# Patient Record
Sex: FEMALE | Race: WHITE | NOT HISPANIC OR LATINO | Employment: FULL TIME | URBAN - METROPOLITAN AREA
[De-identification: names, ages, dates, MRNs, and addresses within clinical notes are randomized per-mention and may not be internally consistent; named-entity substitution may affect disease eponyms.]

---

## 2019-07-20 ENCOUNTER — HOSPITAL ENCOUNTER (EMERGENCY)
Facility: CLINIC | Age: 29
Discharge: HOME | End: 2019-07-20
Attending: EMERGENCY MEDICINE | Admitting: EMERGENCY MEDICINE
Payer: COMMERCIAL

## 2019-07-20 ENCOUNTER — APPOINTMENT (OUTPATIENT)
Dept: ULTRASOUND IMAGING | Facility: CLINIC | Age: 29
End: 2019-07-20
Attending: EMERGENCY MEDICINE
Payer: COMMERCIAL

## 2019-07-20 VITALS
DIASTOLIC BLOOD PRESSURE: 74 MMHG | OXYGEN SATURATION: 100 % | RESPIRATION RATE: 22 BRPM | TEMPERATURE: 98.1 F | WEIGHT: 212 LBS | HEIGHT: 66 IN | HEART RATE: 79 BPM | SYSTOLIC BLOOD PRESSURE: 120 MMHG | BODY MASS INDEX: 34.07 KG/M2

## 2019-07-20 DIAGNOSIS — N93.8 DYSFUNCTIONAL UTERINE BLEEDING: ICD-10-CM

## 2019-07-20 LAB
ALBUMIN SERPL-MCNC: 3.4 G/DL (ref 3.4–5)
ALBUMIN UR-MCNC: 10 MG/DL
ALP SERPL-CCNC: 62 U/L (ref 40–150)
ALT SERPL W P-5'-P-CCNC: 23 U/L (ref 0–50)
ANION GAP SERPL CALCULATED.3IONS-SCNC: 4 MMOL/L (ref 3–14)
APPEARANCE UR: CLEAR
AST SERPL W P-5'-P-CCNC: 19 U/L (ref 0–45)
BACTERIA #/AREA URNS HPF: ABNORMAL /HPF
BASOPHILS # BLD AUTO: 0 10E9/L (ref 0–0.2)
BASOPHILS NFR BLD AUTO: 0.3 %
BILIRUB SERPL-MCNC: 0.4 MG/DL (ref 0.2–1.3)
BILIRUB UR QL STRIP: NEGATIVE
BUN SERPL-MCNC: 13 MG/DL (ref 7–30)
CALCIUM SERPL-MCNC: 8.6 MG/DL (ref 8.5–10.1)
CAOX CRY #/AREA URNS HPF: ABNORMAL /HPF
CHLORIDE SERPL-SCNC: 111 MMOL/L (ref 94–109)
CO2 SERPL-SCNC: 26 MMOL/L (ref 20–32)
COLOR UR AUTO: YELLOW
CREAT SERPL-MCNC: 0.66 MG/DL (ref 0.52–1.04)
DIFFERENTIAL METHOD BLD: NORMAL
EOSINOPHIL # BLD AUTO: 0.1 10E9/L (ref 0–0.7)
EOSINOPHIL NFR BLD AUTO: 0.8 %
ERYTHROCYTE [DISTWIDTH] IN BLOOD BY AUTOMATED COUNT: 12.6 % (ref 10–15)
GFR SERPL CREATININE-BSD FRML MDRD: >90 ML/MIN/{1.73_M2}
GLUCOSE SERPL-MCNC: 82 MG/DL (ref 70–99)
GLUCOSE UR STRIP-MCNC: NEGATIVE MG/DL
HCG SERPL QL: NEGATIVE
HCT VFR BLD AUTO: 41.4 % (ref 35–47)
HGB BLD-MCNC: 14.1 G/DL (ref 11.7–15.7)
HGB UR QL STRIP: ABNORMAL
IMM GRANULOCYTES # BLD: 0 10E9/L (ref 0–0.4)
IMM GRANULOCYTES NFR BLD: 0.2 %
KETONES UR STRIP-MCNC: 10 MG/DL
LEUKOCYTE ESTERASE UR QL STRIP: ABNORMAL
LYMPHOCYTES # BLD AUTO: 2.1 10E9/L (ref 0.8–5.3)
LYMPHOCYTES NFR BLD AUTO: 20.6 %
MCH RBC QN AUTO: 30.3 PG (ref 26.5–33)
MCHC RBC AUTO-ENTMCNC: 34.1 G/DL (ref 31.5–36.5)
MCV RBC AUTO: 89 FL (ref 78–100)
MONOCYTES # BLD AUTO: 1 10E9/L (ref 0–1.3)
MONOCYTES NFR BLD AUTO: 9.6 %
MUCOUS THREADS #/AREA URNS LPF: PRESENT /LPF
NEUTROPHILS # BLD AUTO: 6.9 10E9/L (ref 1.6–8.3)
NEUTROPHILS NFR BLD AUTO: 68.5 %
NITRATE UR QL: NEGATIVE
NRBC # BLD AUTO: 0 10*3/UL
NRBC BLD AUTO-RTO: 0 /100
PH UR STRIP: 5.5 PH (ref 5–7)
PLATELET # BLD AUTO: 300 10E9/L (ref 150–450)
POTASSIUM SERPL-SCNC: 3.6 MMOL/L (ref 3.4–5.3)
PROT SERPL-MCNC: 7.1 G/DL (ref 6.8–8.8)
RBC # BLD AUTO: 4.65 10E12/L (ref 3.8–5.2)
RBC #/AREA URNS AUTO: >182 /HPF (ref 0–2)
SODIUM SERPL-SCNC: 141 MMOL/L (ref 133–144)
SOURCE: ABNORMAL
SP GR UR STRIP: 1.02 (ref 1–1.03)
SQUAMOUS #/AREA URNS AUTO: 1 /HPF (ref 0–1)
UROBILINOGEN UR STRIP-MCNC: NORMAL MG/DL (ref 0–2)
WBC # BLD AUTO: 10.1 10E9/L (ref 4–11)
WBC #/AREA URNS AUTO: 5 /HPF (ref 0–5)

## 2019-07-20 PROCEDURE — 99284 EMERGENCY DEPT VISIT MOD MDM: CPT | Mod: 25

## 2019-07-20 PROCEDURE — 84703 CHORIONIC GONADOTROPIN ASSAY: CPT | Performed by: EMERGENCY MEDICINE

## 2019-07-20 PROCEDURE — 93976 VASCULAR STUDY: CPT

## 2019-07-20 PROCEDURE — 81001 URINALYSIS AUTO W/SCOPE: CPT | Performed by: EMERGENCY MEDICINE

## 2019-07-20 PROCEDURE — 80053 COMPREHEN METABOLIC PANEL: CPT | Performed by: EMERGENCY MEDICINE

## 2019-07-20 PROCEDURE — 85025 COMPLETE CBC W/AUTO DIFF WBC: CPT | Performed by: EMERGENCY MEDICINE

## 2019-07-20 ASSESSMENT — ENCOUNTER SYMPTOMS
ABDOMINAL PAIN: 1
DIZZINESS: 1
FATIGUE: 1
NAUSEA: 1

## 2019-07-20 ASSESSMENT — MIFFLIN-ST. JEOR: SCORE: 1703.38

## 2019-07-21 NOTE — ED PROVIDER NOTES
"  History     Chief Complaint:  Vaginal Bleeding      HPI   Stephanie De La Vega is a 28 year old female who presents with abnormal vaginal bleeding. Seven days ago she started her period as normal and then two days ago the bleeding increased. Patient had to switch from using tampons to pads. She sat down on the toilet and she claims 1/8 cup of blood came out. She has also been passing clots. She endorses some abdominal cramping today as well as nausea, fatigue and dizziness.  She states she placed her NuvaRing yesterday. Patient has not been sexually active in awhile. She does not regularly see an OBGYN but sees her PCP.     Allergies:  No known drug allergies.    Medications:    Albuterol  Adderall   Lunesta  Fluoxetine     Past Medical History:    Asthma  PCOS    Past Surgical History:    Tooth extraction with force     Family History:    HTN    Social History:  Presents to the ED by herself.   Tobacco Use: Former smoker  Alcohol Use: Once/week  PCP: Deisy Story  Marital Status:  Single [1]       Review of Systems   Constitutional: Positive for fatigue.   Gastrointestinal: Positive for abdominal pain and nausea.   Genitourinary: Positive for vaginal bleeding.   Neurological: Positive for dizziness.   All other systems reviewed and are negative.        Physical Exam   First Vitals:  BP: (!) 148/94  Pulse: 60  Temp: 98.1  F (36.7  C)  Resp: 18  Height: 167.6 cm (5' 6\")  Weight: 96.2 kg (212 lb)  SpO2: 100 %      Physical Exam  Nursing note and vitals reviewed.  Constitutional:  Oriented to person, place, and time. Cooperative.   HENT:   Nose:    Nose normal.   Mouth/Throat:   Mucous membranes are normal.   Eyes:    Conjunctivae normal and EOM are normal.      Pupils are equal, round, and reactive to light.   Neck:    Trachea normal.   Cardiovascular:  Normal rate, regular rhythm, normal heart sounds and normal pulses. No murmur heard.  Pulmonary/Chest:  Effort normal and breath sounds normal.   Abdominal:   Soft. " Normal appearance and bowel sounds are normal.      There is no tenderness.      There is no rebound and no CVA tenderness.   Musculoskeletal:  Extremities atraumatic x 4.   Lymphadenopathy:  No cervical adenopathy.   Neurological:   Alert and oriented to person, place, and time. Normal strength.      No cranial nerve deficit or sensory deficit. GCS eye subscore is 4. GCS verbal subscore is 5. GCS motor subscore is 6.   Skin:    Skin is intact. No rash noted.   Psychiatric:   Normal mood and affect.    Emergency Department Course     Imaging:  Radiographic findings were communicated with the patient who voiced understanding of the findings.    US Pelvis Cmplt w Transvag & Doppler LmtPel Duplex Limited   Preliminary Result   IMPRESSION:    1. Unremarkable appearance of the uterus and ovaries. Blood flow is   visualized in both ovaries.   2. No free fluid in the pelvis.           Laboratory:  HCG blood: Negative  CBC:  WBC 10.1, HGB 14.1, , otherwise WNL  CMP: Chloride 111 (H), otherwise WNL (Creatinine 0.66)  UA: Clear yellow urine, ketones 10, large blood, protein 10, small leukocyte esterase, RBC > 182 (H), few bacteria, mucous present, few calcium oxalate, otherwise WNL    Emergency Department Course:  The patient arrived in triage where vitals were measured and recorded.   The patient was then escorted back to the emergency department.   The patient's medical records were reviewed.  Nursing notes and vitals were reviewed.  2040: I performed an exam of the patient as documented above.  The above workup was undertaken.  2238: I rechecked the patient and discussed results.  Findings and plan explained to the Patient. Patient discharged home, status improved, with instructions regarding supportive care, medications, and reasons to return as well as the importance of close follow-up was reviewed.        Impression & Plan      Medical Decision Making:  This is a 28-year-old female who came in with heavy vaginal  bleeding for the last couple of days, which she indicates is unusual for her.  Her vital signs are normal here including her orthostatic vital signs.  She also has a very normal exam.  I proceeded with the above work-up to see if we might find a cause and specifically to look for anemia or signs of pregnancy or other abnormalities on ultrasound.  Her work-up appears unremarkable, which is reassuring.  I think she is safe for discharge and outpatient management.  I indicated that she should follow-up with an OB/GYN, and I am providing her the contact information for the on-call group.  She indicates that she has an OB/GYN clinic that she has gone to in the past and she will likely call them.  She is otherwise follow-up with her own doctor as needed and certainly return though with any concerns or worsening symptoms.    Diagnosis:    ICD-10-CM    1. Dysfunctional uterine bleeding N93.8        Disposition:  Discharged to home.         I, Arabella Bailey, am serving as a scribe on 7/20/2019 at 8:40 PM to personally document services performed by Dr. Lindsay based on my observations and the provider's statements to me.    EMERGENCY DEPARTMENT       Colin Lindsay MD  07/20/19 0258

## 2019-07-21 NOTE — ED TRIAGE NOTES
Pt reports having period a week ago and states that it has not stopped. Pt states that she has become dizzy, increased abdominal cramping, and weakness. Pt reports hx of PCOS.

## 2019-10-02 ENCOUNTER — HEALTH MAINTENANCE LETTER (OUTPATIENT)
Age: 29
End: 2019-10-02

## 2021-01-15 ENCOUNTER — HEALTH MAINTENANCE LETTER (OUTPATIENT)
Age: 31
End: 2021-01-15

## 2021-09-04 ENCOUNTER — HEALTH MAINTENANCE LETTER (OUTPATIENT)
Age: 31
End: 2021-09-04

## 2022-01-29 ENCOUNTER — ANCILLARY PROCEDURE (OUTPATIENT)
Dept: GENERAL RADIOLOGY | Facility: CLINIC | Age: 32
End: 2022-01-29
Attending: PHYSICIAN ASSISTANT
Payer: COMMERCIAL

## 2022-01-29 ENCOUNTER — OFFICE VISIT (OUTPATIENT)
Dept: URGENT CARE | Facility: URGENT CARE | Age: 32
End: 2022-01-29
Payer: COMMERCIAL

## 2022-01-29 VITALS
OXYGEN SATURATION: 100 % | DIASTOLIC BLOOD PRESSURE: 94 MMHG | TEMPERATURE: 98.7 F | HEART RATE: 91 BPM | SYSTOLIC BLOOD PRESSURE: 148 MMHG | RESPIRATION RATE: 16 BRPM

## 2022-01-29 DIAGNOSIS — R06.02 SOB (SHORTNESS OF BREATH): ICD-10-CM

## 2022-01-29 DIAGNOSIS — J45.901 MODERATE ASTHMA WITH EXACERBATION, UNSPECIFIED WHETHER PERSISTENT: ICD-10-CM

## 2022-01-29 DIAGNOSIS — R05.9 COUGH: ICD-10-CM

## 2022-01-29 DIAGNOSIS — R05.9 COUGH: Primary | ICD-10-CM

## 2022-01-29 DIAGNOSIS — R09.89 CHEST CONGESTION: ICD-10-CM

## 2022-01-29 LAB
DEPRECATED S PYO AG THROAT QL EIA: NEGATIVE
FLUAV AG SPEC QL IA: NEGATIVE
FLUBV AG SPEC QL IA: NEGATIVE

## 2022-01-29 PROCEDURE — 71046 X-RAY EXAM CHEST 2 VIEWS: CPT | Performed by: RADIOLOGY

## 2022-01-29 PROCEDURE — 87651 STREP A DNA AMP PROBE: CPT | Performed by: PHYSICIAN ASSISTANT

## 2022-01-29 PROCEDURE — 87804 INFLUENZA ASSAY W/OPTIC: CPT | Performed by: PHYSICIAN ASSISTANT

## 2022-01-29 PROCEDURE — 99205 OFFICE O/P NEW HI 60 MIN: CPT | Performed by: PHYSICIAN ASSISTANT

## 2022-01-29 PROCEDURE — U0003 INFECTIOUS AGENT DETECTION BY NUCLEIC ACID (DNA OR RNA); SEVERE ACUTE RESPIRATORY SYNDROME CORONAVIRUS 2 (SARS-COV-2) (CORONAVIRUS DISEASE [COVID-19]), AMPLIFIED PROBE TECHNIQUE, MAKING USE OF HIGH THROUGHPUT TECHNOLOGIES AS DESCRIBED BY CMS-2020-01-R: HCPCS | Mod: 90 | Performed by: PHYSICIAN ASSISTANT

## 2022-01-29 PROCEDURE — 99000 SPECIMEN HANDLING OFFICE-LAB: CPT | Performed by: PHYSICIAN ASSISTANT

## 2022-01-29 PROCEDURE — U0005 INFEC AGEN DETEC AMPLI PROBE: HCPCS | Mod: 90 | Performed by: PHYSICIAN ASSISTANT

## 2022-01-29 RX ORDER — DEXTROAMPHETAMINE SACCHARATE, AMPHETAMINE ASPARTATE, DEXTROAMPHETAMINE SULFATE AND AMPHETAMINE SULFATE 5; 5; 5; 5 MG/1; MG/1; MG/1; MG/1
TABLET ORAL 2 TIMES DAILY
COMMUNITY
Start: 2022-01-12 | End: 2023-02-23

## 2022-01-29 RX ORDER — ESZOPICLONE 2 MG/1
TABLET, FILM COATED ORAL
COMMUNITY
Start: 2022-01-26 | End: 2023-02-23

## 2022-01-29 RX ORDER — AZITHROMYCIN 250 MG/1
TABLET, FILM COATED ORAL
Qty: 6 TABLET | Refills: 0 | Status: SHIPPED | OUTPATIENT
Start: 2022-01-29 | End: 2022-02-03

## 2022-01-29 RX ORDER — PREDNISONE 20 MG/1
20 TABLET ORAL 2 TIMES DAILY
Qty: 10 TABLET | Refills: 0 | Status: SHIPPED | OUTPATIENT
Start: 2022-01-29 | End: 2023-02-23

## 2022-01-29 RX ORDER — ALBUTEROL SULFATE 90 UG/1
2 AEROSOL, METERED RESPIRATORY (INHALATION) EVERY 6 HOURS
Qty: 8.5 G | Refills: 0 | Status: SHIPPED | OUTPATIENT
Start: 2022-01-29 | End: 2023-02-23

## 2022-01-30 LAB — GROUP A STREP BY PCR: NOT DETECTED

## 2022-01-30 NOTE — PROGRESS NOTES
Assessment & Plan     Cough  covid pending  Check my chart  Influenza neg  Strep neg, culture pending  Chest xray Negative for acute findings, read by Donell DOUGHERTY at time of visit.    - Symptomatic; Unknown COVID-19 Virus (Coronavirus) by PCR Nose  - Influenza A & B Antigen  - Streptococcus A Rapid Screen w/Reflex to PCR  - XR Chest 2 Views; Future  - Group A Streptococcus PCR Throat Swab    Moderate asthma with exacerbation, unspecified whether persistent  Albuterol and prednisone  Continue treatment with nebs prn, use inhaler prn  - XR Chest 2 Views; Future  - albuterol (PROVENTIL HFA) 108 (90 Base) MCG/ACT inhaler; Inhale 2 puffs into the lungs every 6 hours  - predniSONE (DELTASONE) 20 MG tablet; Take 1 tablet (20 mg) by mouth 2 times daily    SOB (shortness of breath)  Chest xray Negative for acute findings, read by Donell DOUGHERTY at time of visit.  Due to asthma  - XR Chest 2 Views; Future  - albuterol (PROVENTIL HFA) 108 (90 Base) MCG/ACT inhaler; Inhale 2 puffs into the lungs every 6 hours  - predniSONE (DELTASONE) 20 MG tablet; Take 1 tablet (20 mg) by mouth 2 times daily    Chest congestion  zpak for chest congestion  - azithromycin (ZITHROMAX) 250 MG tablet; Take 2 tablets (500 mg) by mouth daily for 1 day, THEN 1 tablet (250 mg) daily for 4 days.    60 minutes spent on the date of the encounter doing chart review, history and exam, documentation and further activities per the note    No follow-ups on file.    Donell Turcios PA-C  [facility] NESHA Brown is a 31 year old who presents for the following health issues     HPI     Asthma  Cough  Chest congestion    Review of Systems   Constitutional, HEENT, cardiovascular, pulmonary, gi and gu systems are negative, except as otherwise noted.      Objective    BP (!) 148/94   Pulse 91   Temp 98.7  F (37.1  C) (Tympanic)   Resp 16   SpO2 100%   There is no height or weight on file to calculate  BMI.  Physical Exam   GENERAL: healthy, alert and no distress  EYES: Eyes grossly normal to inspection, PERRL and conjunctivae and sclerae normal  HENT: ear canals and TM's normal, nose and mouth without ulcers or lesions  NECK: no adenopathy, no asymmetry, masses, or scars and thyroid normal to palpation  RESP: Positive for bronchospasms  CV: regular rate and rhythm, normal S1 S2, no S3 or S4, no murmur, click or rub, no peripheral edema and peripheral pulses strong  MS: no gross musculoskeletal defects noted, no edema  SKIN: no suspicious lesions or rashes  NEURO: Normal strength and tone, mentation intact and speech normal  PSYCH: mentation appears normal, affect normal/bright    Results for orders placed or performed in visit on 01/29/22   XR Chest 2 Views     Status: None    Narrative    EXAM: XR CHEST 2 VW  LOCATION: [facility]  DATE/TIME: 1/29/2022 6:12 PM    INDICATION:  Cough, Moderate asthma with exacerbation, unspecified whether persistent, SOB (shortness of breath)  COMPARISON: None.      Impression    IMPRESSION: Negative chest.   Results for orders placed or performed in visit on 01/29/22   Influenza A & B Antigen     Status: Normal    Specimen: Nose; Swab   Result Value Ref Range    Influenza A antigen Negative Negative    Influenza B antigen Negative Negative    Narrative    Test results must be correlated with clinical data. If necessary, results should be confirmed by a molecular assay or viral culture.   Streptococcus A Rapid Screen w/Reflex to PCR     Status: Normal    Specimen: Throat; Swab   Result Value Ref Range    Group A Strep antigen Negative Negative

## 2022-02-01 ENCOUNTER — TELEPHONE (OUTPATIENT)
Dept: NURSING | Facility: CLINIC | Age: 32
End: 2022-02-01
Payer: COMMERCIAL

## 2022-02-01 LAB — SARS-COV-2 RNA RESP QL NAA+PROBE: DETECTED

## 2022-02-01 NOTE — TELEPHONE ENCOUNTER
"Coronavirus (COVID-19) Notification    Caller Name (Patient, parent, daughter/son, grandparent, etc)  Stephanie    Reason for call  Notify of Positive Coronavirus (COVID-19) lab results, assess symptoms,  review  [employer] [facility] recommendations    Lab Result    Lab test:  2019-nCoV rRt-PCR or SARS-CoV-2 PCR    Oropharyngeal AND/OR nasopharyngeal swabs is POSITIVE for 2019-nCoV RNA/SARS-COV-2 PCR (COVID-19 virus)    RN Recommendations/Instructions per [facility] Coronavirus COVID-19 recommendations    Brief introduction script  Introduce self then review script:  \"I am calling on behalf of [employer].  We were notified that your Coronavirus test (COVID-19) for was POSITIVE for the virus.  I have some information to relay to you but first I wanted to mention that the [place] Dept of Health will be contacting you shortly [it's possible MD already called Patient] to talk to you more about how you are feeling and other people you have had contact with who might now also have the virus.  Also,  [employer] [facility] is Partnering with the [facility] for Covid-19 research, you may be contacted directly by research staff.\"      Assessment (Inquire about Patient's current symptoms)   Assessment   Current Symptoms at time of phone call: (if no symptoms, document No symptoms] No symptoms   Date of symptom(s) onset (if applicable) 1/19/2022     If at time of call, Patients symptoms hare worsened, the Patient should contact 911 or have someone drive them to Emergency Dept promptly:      If Patient calling 911, inform 911 personal that you have tested positive for the Coronavirus (COVID-19).  Place mask on and await 911 to arrive.    If Emergency Dept, If possible, please have another adult drive you to the Emergency Dept but you need to wear mask when in contact with other people.          Treatment Options:   Patient classified as COVID treatment eligible by Epic high risk algorithm: Yes  Is the patient symptomatic at " the time of result notification? No    Review information with Patient    Your result was positive. This means you have COVID-19 (coronavirus).  We have sent you a letter that reviews the information that I'll be reviewing with you now.    How can I protect others?    If you have symptoms: stay home and away from others (self-isolate) until:    You've had no fever--and no medicine that reduces fever--for 1 full day (24 hours). And       Your other symptoms have gotten better. For example, your cough or breathing has improved. And     At least 10 days have passed since your symptoms started. (If you've been told by a doctor that you have a weak immune system, wait 20 days.)     If you don't have symptoms: Stay home and away from others (self-isolate) until at least 10 days have passed since your first positive COVID-19 test. (Date test collected)    During this time:    Stay in your own room, including for meals. Use your own bathroom if you can.    Stay away from others in your home. No hugging, kissing or shaking hands. No visitors.     Don't go to work, school or anywhere else.     Clean  high touch  surfaces often (doorknobs, counters, handles, etc.). Use a household cleaning spray or wipes. You'll find a full list on the EPA website at www.epa.gov/pesticide-registration/list-n-disinfectants-use-against-sars-cov-2.     Cover your mouth and nose with a mask, tissue or other face covering to avoid spreading germs.    Wash your hands and face often with soap and water.    Make a list of people you have been in close contact with recently, even if either of you wore a face covering.   - Start your list from 2 days before you became ill or had a positive test.  - Include anyone that was within 6 feet of you for a cumulative total of 15 minutes or more in 24 hours. (Example: if you sat next to Mark for 5 minutes in the morning and 10 minutes in the afternoon, then you were in close contact for 15 minutes total that  day. Mark would be added to your list.)    A public health worker will call or text you. It is important that you answer. They will ask you questions about possible exposures to COVID-19, such as people you have been in direct contact with and places you have visited.    Tell the people on your list that you have COVID-19; they should stay away from others for 14 days starting from the last time they were in contact with you (unless you are told something different from a public health worker).     Caregivers in these groups are at risk for severe illness due to COVID-19:  o People 65 years and older  o People who live in a nursing home or long-term care facility  o People with chronic disease (lung, heart, cancer, diabetes, kidney, liver, immunologic)  o People who have a weakened immune system, including those who:  - Are in cancer treatment  - Take medicine that weakens the immune system, such as corticosteroids  - Had a bone marrow or organ transplant  - Have an immune deficiency  - Have poorly controlled HIV or AIDS  - Are obese (body mass index of 40 or higher)  - Smoke regularly    Caregivers should wear gloves while washing dishes, handling laundry and cleaning bedrooms and bathrooms.    Wash and dry laundry with special caution. Don't shake dirty laundry, and use the warmest water setting you can.    If you have a weakened immune system, ask your doctor about other actions you should take.    For more tips, go to www.cdc.gov/coronavirus/2019-ncov/downloads/10Things.pdf.    You should not go back to work until you meet the guidelines above for ending your home isolation. You don't need to be retested for COVID-19 before going back to work--studies show that you won't spread the virus if it's been at least 10 days since your symptoms started (or 20 days, if you have a weak immune system).    Employers: This document serves as formal notice of your employee's medical guidelines for going back to work. They  must meet the above guidelines before going back to work in person.    How can I take care of myself?    1. Get lots of rest. Drink extra fluids (unless a doctor has told you not to).    2. Take Tylenol (acetaminophen) for fever or pain. If you have liver or kidney problems, ask your family doctor if it's okay to take Tylenol.     Take either:     650 mg (two 325 mg pills) every 4 to 6 hours, or     1,000 mg (two 500 mg pills) every 8 hours as needed.     Note: Don't take more than 3,000 mg in one day. Acetaminophen is found in many medicines (both prescribed and over-the-counter medicines). Read all labels to be sure you don't take too much.    For children, check the Tylenol bottle for the right dose (based on their age or weight).    3. If you have other health problems (like cancer, heart failure, an organ transplant or severe kidney disease): Call your specialty clinic if you don't feel better in the next 2 days.    4. Know when to call 911: Emergency warning signs include:    Trouble breathing or shortness of breath    Pain or pressure in the chest that doesn't go away    Feeling confused like you haven't felt before, or not being able to wake up    Bluish-colored lips or face    5. Sign up for [employer]. We know it's scary to hear that you have COVID-19. We want to track your symptoms to make sure you're okay over the next 2 weeks. Please look for an email from [employer]--this is a free, online program that we'll use to keep in touch. To sign up, follow the link in the email. Learn more at [email or web address].    Where can I get more information?    [facility]: [email or web address]    Coronavirus Basics: www.health.state.[place]/diseases/coronavirus/basics.html    What to Do If You're Sick: www.cdc.gov/coronavirus/2019-ncov/about/steps-when-sick.html    Ending Home Isolation: www.cdc.gov/coronavirus/2019-ncov/hcp/disposition-in-home-patients.html     Caring for Someone with  COVID-19: www.cdc.gov/coronavirus/2019-ncov/if-you-are-sick/care-for-someone.html     [facility] clinical trials (COVID-19 research studies): clinicalaffairs.[facility]-clinical-trials     A Positive COVID-19 letter will be sent via [employer] or the mail. (Exception, no letters sent to Presurgerical/Preprocedure Patients)    Radha Powell

## 2022-02-01 NOTE — TELEPHONE ENCOUNTER
Patient had a few more isolation questions.  Patient is past 10 day isolation guideline.  Patient is feeling fine and no fever for over 24 hours.  This nurse answered patient questions.  Patient has no further questions.      Tran Recinos LPN

## 2022-02-19 ENCOUNTER — HEALTH MAINTENANCE LETTER (OUTPATIENT)
Age: 32
End: 2022-02-19

## 2022-06-11 ENCOUNTER — HEALTH MAINTENANCE LETTER (OUTPATIENT)
Age: 32
End: 2022-06-11

## 2022-10-22 ENCOUNTER — HEALTH MAINTENANCE LETTER (OUTPATIENT)
Age: 32
End: 2022-10-22

## 2022-12-16 ENCOUNTER — TELEPHONE (OUTPATIENT)
Dept: UROLOGY | Facility: CLINIC | Age: 32
End: 2022-12-16

## 2022-12-16 DIAGNOSIS — R69 DIAGNOSIS UNKNOWN: Primary | ICD-10-CM

## 2022-12-16 NOTE — TELEPHONE ENCOUNTER
Pt calls into clinic requesting stone appt    She went to [facility] in [facility], will pull in [place]    She states 6mm proximal stone    No fever or chills, some nausea. Has flomax, zofran and oxycodone      Pt travelling Jan 9 for work    Will call pt early next week for followup plan

## 2022-12-16 NOTE — TELEPHONE ENCOUNTER
[facility] Call Center    Phone Message    May a detailed message be left on voicemail: yes     Reason for Call: Patient was diagnoised with a 6mm kidney stone at the ER in [place] on 12/13. They did a CT also. Patient is getting records sent over. Writer put in a self referral. If calling patient back today please don't call until after 3pm. Thank you.    Action Taken: Message routed to:  Other: [facility]    Travel Screening: Not Applicable

## 2022-12-19 ENCOUNTER — DOCUMENTATION ONLY (OUTPATIENT)
Dept: UROLOGY | Facility: CLINIC | Age: 32
End: 2022-12-19

## 2022-12-19 NOTE — TELEPHONE ENCOUNTER
Pt phoned and notified in detailed VM Images cannot be pushed. Images have to come through the mail on a CD. Requested pt call back with symptom update and for planning    MARLENA Garcia  Care Coordinator  [phone]

## 2022-12-19 NOTE — PROGRESS NOTES
Action 2023 JTV 4:43 PM    Action Taken CSs sent an urgent request to [facility] for records.        Action 2022 JTV 10:41 AM    Action Taken CSS called patient.  Patient confirmed she has not been seen anywhere else besides in [place]. Patient gave VB OK to update medical records. Patient confirmed she was seen there on 2022  CT ABD PELVIS -- 2022  Patient states she sent the hospital the appropriate  LC.    CSS called and spoke with the film room at [facility]. CSS confirmed images and faxed out a request for images to be mailed out using shipping label.    Film room Fax: [phone]  Trackin    [facility]  Film Room   [address], [place] [address]    [facility] sent and urgent request to [facility] for records. Fax: [phone]    CSS sent a message to Nurse with update.

## 2022-12-20 NOTE — PROGRESS NOTES
Action 12.20.22 MJ   Action Taken Received CD from [facility]. 12.13.22 image now in PACS.

## 2022-12-20 NOTE — CONFIDENTIAL NOTE
Records received  December 20, 2022 11:17 AM  [id number]   Facility  [facility]    Outcome Received imaging disc, sent to [facility] to upload - Amay   12/13/22 CT Abd Pelvis

## 2022-12-27 ENCOUNTER — PRE VISIT (OUTPATIENT)
Dept: UROLOGY | Facility: CLINIC | Age: 32
End: 2022-12-27

## 2023-01-16 ENCOUNTER — LAB (OUTPATIENT)
Dept: LAB | Facility: CLINIC | Age: 33
End: 2023-01-16
Payer: COMMERCIAL

## 2023-01-16 ENCOUNTER — ANCILLARY PROCEDURE (OUTPATIENT)
Dept: CT IMAGING | Facility: CLINIC | Age: 33
End: 2023-01-16
Attending: STUDENT IN AN ORGANIZED HEALTH CARE EDUCATION/TRAINING PROGRAM
Payer: COMMERCIAL

## 2023-01-16 ENCOUNTER — PATIENT OUTREACH (OUTPATIENT)
Dept: UROLOGY | Facility: CLINIC | Age: 33
End: 2023-01-16

## 2023-01-16 DIAGNOSIS — N39.0 URINARY TRACT INFECTION: ICD-10-CM

## 2023-01-16 DIAGNOSIS — N20.0 KIDNEY STONE: Primary | ICD-10-CM

## 2023-01-16 DIAGNOSIS — N20.0 KIDNEY STONE: ICD-10-CM

## 2023-01-16 LAB
ALBUMIN UR-MCNC: NEGATIVE MG/DL
APPEARANCE UR: CLEAR
BACTERIA #/AREA URNS HPF: ABNORMAL /HPF
BILIRUB UR QL STRIP: NEGATIVE
COLOR UR AUTO: YELLOW
GLUCOSE UR STRIP-MCNC: NEGATIVE MG/DL
HGB UR QL STRIP: ABNORMAL
HYALINE CASTS #/AREA URNS LPF: ABNORMAL /LPF
KETONES UR STRIP-MCNC: ABNORMAL MG/DL
LEUKOCYTE ESTERASE UR QL STRIP: ABNORMAL
MUCOUS THREADS #/AREA URNS LPF: PRESENT /LPF
NITRATE UR QL: NEGATIVE
PH UR STRIP: 5 [PH] (ref 5–7)
RBC #/AREA URNS AUTO: ABNORMAL /HPF
SP GR UR STRIP: 1.01 (ref 1–1.03)
SQUAMOUS #/AREA URNS AUTO: ABNORMAL /LPF
UROBILINOGEN UR STRIP-ACNC: 0.2 E.U./DL
WBC #/AREA URNS AUTO: ABNORMAL /HPF
WBC CLUMPS #/AREA URNS HPF: PRESENT /HPF

## 2023-01-16 PROCEDURE — 81001 URINALYSIS AUTO W/SCOPE: CPT

## 2023-01-16 PROCEDURE — 74176 CT ABD & PELVIS W/O CONTRAST: CPT | Mod: TC | Performed by: RADIOLOGY

## 2023-01-16 PROCEDURE — 87086 URINE CULTURE/COLONY COUNT: CPT

## 2023-01-16 NOTE — TELEPHONE ENCOUNTER
Pt states she gets recurrent post coital UTIs, she states she had sexual intercourse last week and symptoms occurred following morning while she was travelling in [place]. She received 1 week of macrobid through online care site. Pt states she completed course of abx and then had sexual intercourse that evening. Yesterday morning she woke with same symptoms. She agrees to UA/UC today. She leaves tomorrow evening for [place] for work, hoping for treatment prior to flight    She has not seen stone passage since her last CT also. Pt will complete updated CT today and UA prior to follow up visit tomorrow.     Of note, she would like to discuss post coital abx plan tomorrow at visit as well, she has done in the past with great result.    CT today at [place] location-105 check in for 120 appt  Lab scheduled for just prior  Virtual scheduled for tomorrow to review    Pt agreeable to plan.

## 2023-01-17 ENCOUNTER — VIRTUAL VISIT (OUTPATIENT)
Dept: UROLOGY | Facility: CLINIC | Age: 33
End: 2023-01-17
Payer: COMMERCIAL

## 2023-01-17 DIAGNOSIS — N20.0 CALCULUS OF KIDNEY: ICD-10-CM

## 2023-01-17 DIAGNOSIS — N13.2 HYDRONEPHROSIS WITH URINARY OBSTRUCTION DUE TO URETERAL CALCULUS: ICD-10-CM

## 2023-01-17 DIAGNOSIS — N20.1 CALCULUS OF URETER: Primary | ICD-10-CM

## 2023-01-17 PROCEDURE — 99203 OFFICE O/P NEW LOW 30 MIN: CPT | Mod: 95 | Performed by: PHYSICIAN ASSISTANT

## 2023-01-17 RX ORDER — DEXTROAMPHETAMINE SULFATE 15 MG/1
30 CAPSULE, EXTENDED RELEASE ORAL DAILY
COMMUNITY
Start: 2023-01-14 | End: 2023-02-23

## 2023-01-17 ASSESSMENT — PAIN SCALES - GENERAL: PAINLEVEL: NO PAIN (0)

## 2023-01-17 NOTE — PROGRESS NOTES
Assessment/Plan:    Assessment & Plan   Stephanie was seen today for new patient.    Diagnoses and all orders for this visit:    Calculus of ureter  -     Patient Stated Goal: Pass my stone  -     CT Pelvis Soft Tissue wo Contrast; Future    Hydronephrosis with urinary obstruction due to ureteral calculus    Calculus of kidney    Stone Management Plan  Stone Management 1/17/2023   Urinary Tract Infection Possible Infection   Renal Colic Well controlled symptoms   Renal Failure No suspicion of renal failure   Current CT date 1/16/2023   Right sided stones? Yes   R Number of ureteral stones 1   R GSD of ureteral stones 6   R Location of ureteral stone Distal   R Number of kidney stones  1   R GSD of kidney stones 2 - 4   R Hydronephrosis Moderate   R Stone Event New event   Diagnosis date 12/13/2022   Initial location of primary symptomatic stone Proximal   Initial GSD of primary symptomatic stone 6   R MET status Initiation   R Current Plan MET   MET 2 week F/U   Left sided stones? No   L Stone Event No current event         PLAN    33 yo F first time stone former with progressing, obstructing right distal ureteral stone, initially diagnosed > 1 month ago. Nonobstructing right renal stone.    Will proceed with medical expulsive therapy. Recommend she travel with copy of recent CT on disc. Risks and benefits were detailed of medical expulsive therapy including probability of stone passage, recurrent renal colic, and requirement of emergency medical and/or surgical care and further imaging. Patient verbalized understanding. Patient agrees with plan as discussed. She will return in 2 weeks with low dose CT scan.    For symptom control, she has vicodin, ondansetron and Flomax. Over the counter symptom control medications of ibuprofen, Dramamine and Tylenol were recommended.    Telephone call duration: 29 minutes; start time 9:28, stop time 9:57  Distant site (provider site): provider off-site; patient at home  35 minutes  spent on the date of the encounter doing chart review, history and exam, documentation and further activities per the note    Tesha Ramirez PA-C  [facility]    Subjective:     HPI  Ms. Stephanie De La Vega is a 32 year old female who is being evaluated via a billable telephone visit by [facility] [place] self referred for urolithiasis.    She is a first time unidentified composition stone former. She has no identified modifiable stone risk factors. She has no identified non-modifiable stone risk factors.    She is seeking evaluation for recurrent lower urinary tract symptoms. She reports history of recurrent post coital UTI's with recent symptoms last week, while in [place]. She completed a course of macrobid via online televisit. She had symptoms re-emerge 2 days ago. She has history of kidney stones, and had a CT which noted an obstructing ureteral stone, which is not confirmed to have passed. She leaves for [place] this evening for work.    She was traveling for work in December through [place] when she developed gross hematuria with pain. She had UA and CT which showed a kidney stone within right UPJ. She was discharged home Norco, zofran, and flomax. She was doing better, with resolution of hematuria within a day. Pain returned ~ 2 weeks later with severe abdominal cramping, nausea, and vomiting. She has not seen a stone pass. She recently noticed symptoms developing after intercourse, with urinary urgency, frequency, and dysuria. Recently completed course of macrobid.    She is doing better but has not seen the stone pass. Pertinent negative current symptoms include:  fever, chills, right flank pain, left flank pain, nausea, vomiting, dysuria, and hematuria.     CT scan from 1/16/23 is personally reviewed and demonstrates a moderately obstructing 6 mm right distal ureteral stone, migrated from right UPJ as seen on imaging 12/13/22. Stable, nonobstructing 3 mm  right lower pole renal stone.    Significant labs from presentation include mild hematuria, mild pyuria, negative nitrite, few bacteria and pending results on urine culture.    ROS   A 12 point comprehensive review of systems is negative except for HPI    Past Medical History:   Diagnosis Date     PCOS (polycystic ovarian syndrome)      Uncomplicated asthma      Past Surgical History:   Procedure Laterality Date     HC TOOTH EXTRACTION W/FORCEP  7/2009     HCL PAP SMEAR  4/2009     Current Outpatient Medications   Medication Sig Dispense Refill     albuterol (PROVENTIL HFA) 108 (90 Base) MCG/ACT inhaler Inhale 2 puffs into the lungs every 6 hours 8.5 g 0     albuterol (PROVENTIL HFA: VENTOLIN HFA) 108 (90 BASE) MCG/ACT inhaler Inhale 2 puffs into the lungs every 4 hours as needed for shortness of breath / dyspnea. (Patient not taking: Reported on 1/29/2022) 1 Inhaler 1     amphetamine-dextroamphetamine (ADDERALL) 20 MG tablet Take by mouth 2 times daily       atovaquone-proguanil (MALARONE) 250-100 MG per tablet Take 1 tablet by mouth daily. Begin taking daily 2 days before exposure and continuing until 7 days after exposure (Patient not taking: Reported on 1/29/2022) 30 tablet 0     dextroamphetamine (DEXEDRINE SPANSULE) 15 MG 24 hr capsule Take 30 mg by mouth daily       eszopiclone (LUNESTA) 2 MG tablet        FLUoxetine (PROZAC) 20 MG capsule Take 20 mg by mouth daily       NUVARING 0.12-0.015 MG/24HR VA RING  0 0     predniSONE (DELTASONE) 20 MG tablet Take 1 tablet (20 mg) by mouth 2 times daily 10 tablet 0       Allergies   Allergen Reactions     Adhesive Tape        Social History     Socioeconomic History     Marital status: Single     Spouse name: Not on file     Number of children: 0     Years of education: Not on file     Highest education level: Not on file   Occupational History     Employer: [employer]   Tobacco Use     Smoking status: Former     Smokeless tobacco: Never   Substance and Sexual  Activity     Alcohol use: Yes     Alcohol/week: 1.7 standard drinks     Types: 2 Standard drinks or equivalent per week     Comment: once a weekend.      Drug use: Never     Sexual activity: Yes     Partners: Male     Birth control/protection: I.U.D.   Other Topics Concern      Service No     Blood Transfusions No     Caffeine Concern Not Asked     Occupational Exposure No     Hobby Hazards No     Sleep Concern No     Stress Concern No     Weight Concern Yes     Comment: 15 pounds weight loss     Special Diet Yes     Back Care Not Asked     Exercise Yes     Bike Helmet Not Asked     Seat Belt Yes     Self-Exams Not Asked     Parent/sibling w/ CABG, MI or angioplasty before 65F 55M? Not Asked   Social History Narrative     Not on file     Social Determinants of Health     Financial Resource Strain: Not on file   Food Insecurity: Not on file   Transportation Needs: Not on file   Physical Activity: Not on file   Stress: Not on file   Social Connections: Not on file   Intimate Partner Violence: Not on file   Housing Stability: Not on file       Family History   Problem Relation Age of Onset     Hypertension Mother      C.A.D. No family hx of      Diabetes No family hx of        Objective:     No vitals or physical exam obtained due to virtual visit    LABS  7-Day Micro Results     Collected Updated Procedure Result Status      01/16/2023 1317 01/16/2023 1318 Urine Culture Aerobic Bacterial [[id number]]   Urine, Clean Catch    In process Component Value   No component results                   Most Recent Urinalysis:  Recent Labs   Lab Test 01/16/23  1317   COLOR Yellow   APPEARANCE Clear   URINEGLC Negative   URINEBILI Negative   URINEKETONE Trace*   SG 1.015   UBLD Small*   URINEPH 5.0   PROTEIN Negative   UROBILINOGEN 0.2   NITRITE Negative   LEUKEST Trace*   RBCU 0-2   WBCU 5-10*     Acute Labs Urine Culture  No results found for: CULTURE

## 2023-01-17 NOTE — PROGRESS NOTES
Patient is roomed via telephone for a virtual visit.  Patient confirmed she is in the [facility] at the time of this appointment.  Patient understand that this visit is billable and agree to proceed with appointment.

## 2023-01-17 NOTE — PATIENT INSTRUCTIONS
Patient Stated Goal: Pass my stone  Symptom Control While Passing A Stone    The goal of [facility] [place] is to let a smaller kidney stone (less than 4 to 5 mm) pass without intervention if possible. Giving your body a chance to clear the stone may take a few hours up to a few weeks.  Keeping you well-informed, safe and fairly comfortable is important.    Drink to thirst  Do not attempt to  flush out  your stone by drinking too much fluid. This does not work and may increase nausea. Drink enough to satisfy your body s thirst. Eating your normal diet is fine.   Medications (that may be suggested or prescribed)  Ibuprofen (Advil or Motrin) Available over the counter  Take two (200mg) tablets every six hours as needed  Prevents spasm of the ureter.    Decreases pain.    Acetaminophen (Tylenol) Available over the counter  Take two (500mg) tablets every six hours as needed  Prevents spasm of the ureter.    Decreases pain.    Dramamine* (drowsy version, non-generic formulation) Available over the counter  Take 50mg at bedtime  Decreases spasms of the ureter  Decreases nausea  Decreases acute pain  Decreases recurrence of pain for 24 hours  Will help you sleep  *This medication will cause increase drowsiness, do not drive or operate machinery for 6 hours. Avoid combining with other medications that can cause drowsiness.    Narcotics (Percocet, Vicodin, Oxycodone, Dilaudid) Take as prescribed for severe pain unrelieved by ibuprofen and Dramamine  Narcotics have significant side effects and only  cover-up  pain. They have no effect on the cause of pain.  Common side effects  Confusion, disorientation and sedation - DO NOT DRIVE OR OPERATE MACHINERY WITHIN 24 HOURS  Nausea - take Dramamine or Zofran or Haldol to help control  Constipation  Sleep disturbances    Ondansetron (Zofran) Take as prescribed  Reserve for severe nausea  May cause constipation, start over the counter Miralax if needed    Second Line Anti-Nausea  Medication: Adding a different anti-nausea medication maybe helpful for persistent nausea.  The combined effect of different types of anti-nausea medications maybe more effective than either medication by itself, even in higher doses.  Compazine: Take as prescribed      Information about kidney stones  Crystals can form if chemicals are too concentrated in your urine. If the crystal grows over time, a stone may form. A stone usually isn t painful while it is still in the kidney.  As the stone begins to leave the kidney, you may experience episodes of flank pain as the kidney stone approaches the entrance to the ureter. Some people feel a vague ache in the side.  Kidney stones may fall into the ureter. Some stones are tiny and pass through without causing symptoms. The ureter is a small tube (approximately 1/8 of an inch wide). A kidney stone can get stuck and block the ureter. If this happens, urine backs up and flows back to the kidney. Back pressure on the kidney can cause:  Severe flank pain radiating to the groin.  Severe nausea and vomiting.  The pain can occur in the lower back, side, groin or all three.    When the stone reaches the lower ureter, this can irritate the bladder and sensations of feeling the urge to urinate frequently and urgently may occur.    Once the stone passes out of your ureter and into your bladder, the symptoms of urgency and frequency will often disappear. Sometimes pain will come back for a short period and will not be as severe as before. The passage of the stone from your bladder and out of your body is usually not a problem. The urethra is at least twice as wide as the normal ureter, so the stone doesn t usually block it.    Strain all urine  If you pass the stone, save it. Place it in the container we have provided and bring it to the [facility] [place] within a week of passing it. Your stone will then be sent for analysis which takes about a month.     Signs and symptoms you  might experience  Nausea  Decreased appetite  Urinary frequency  Bloody urine   Chills  Fatigue    When to call [facility] or go to the Emergency Room  Fever with a temperature greater than 100.1  Severe pain  Persistent nausea/vomiting    If the pain worsens or nausea/vomiting is uncontrolled with medications, STOP eating & drinking. You need to have an empty stomach for 8 hours prior to surgery. Call the [facility] [place] immediately at [phone].           Follow-up  Low dose CT scan with doctor visit 1-2 weeks after initial clinic visit per doctor s instructions    Please cancel the CT scan visit if you pass a stone. Reschedule for a one month follow-up with doctor to discuss stone composition and future prevention.    Preventing future stones    Approximately a month after your stone is sent out for analysis, a prevention visit will occur with your provider, to discuss an individualized plan for prevention of new stones and to discuss managing stones that you may still have. Along with the analysis of the kidney stone, blood and urine tests may be indicated to develop this plan. Knowing the type of kidney stones you make, and why, allows the providers at the [facility] to recommend specific ways to prevent them.    Follow-up visits are an important part of monitoring and preventing future re-occurrences.    The [facility] is available for questions or concerns 24 hours a day at [phone]

## 2023-01-18 ENCOUNTER — TELEPHONE (OUTPATIENT)
Dept: UROLOGY | Facility: CLINIC | Age: 33
End: 2023-01-18
Payer: COMMERCIAL

## 2023-01-18 LAB — BACTERIA UR CULT: NORMAL

## 2023-01-19 ENCOUNTER — TELEPHONE (OUTPATIENT)
Dept: UROLOGY | Facility: CLINIC | Age: 33
End: 2023-01-19
Payer: COMMERCIAL

## 2023-01-30 ENCOUNTER — ANCILLARY PROCEDURE (OUTPATIENT)
Dept: CT IMAGING | Facility: CLINIC | Age: 33
End: 2023-01-30
Attending: PHYSICIAN ASSISTANT
Payer: COMMERCIAL

## 2023-01-30 DIAGNOSIS — N20.1 CALCULUS OF URETER: ICD-10-CM

## 2023-01-30 PROCEDURE — 72192 CT PELVIS W/O DYE: CPT | Mod: TC | Performed by: RADIOLOGY

## 2023-01-31 ENCOUNTER — VIRTUAL VISIT (OUTPATIENT)
Dept: UROLOGY | Facility: CLINIC | Age: 33
End: 2023-01-31
Payer: COMMERCIAL

## 2023-01-31 DIAGNOSIS — N20.1 CALCULUS OF URETER: Primary | ICD-10-CM

## 2023-01-31 PROCEDURE — 99212 OFFICE O/P EST SF 10 MIN: CPT | Mod: 95 | Performed by: UROLOGY

## 2023-01-31 NOTE — PATIENT INSTRUCTIONS
Patient Stated Goal: Pass my stone  Symptom Control While Passing A Stone    The goal of [facility] [place] is to let a smaller kidney stone (less than 4 to 5 mm) pass without intervention if possible. Giving your body a chance to clear the stone may take a few hours up to a few weeks.  Keeping you well-informed, safe and fairly comfortable is important.    Drink to thirst  Do not attempt to  flush out  your stone by drinking too much fluid. This does not work and may increase nausea. Drink enough to satisfy your body s thirst. Eating your normal diet is fine.   Medications (that may be suggested or prescribed)    Ibuprofen (Advil or Motrin) Available over the counter  o Take two (200mg) tablets every six hours until the stone passes.  o Prevents spasm of the ureter.    o Decreases pain.      Dramamine* (drowsy version, non-generic formulation) Available over the counter  o Take 50mg at bedtime  o Decreases spasms of the ureter  o Decreases nausea  o Decreases acute pain  o Decreases recurrence of pain for 24 hours  o Will help you sleep  *This medication will cause increase drowsiness, do not drive or operate machinery for 6 hours.      Narcotics (Percocet, Vicodin, Dilaudid) Take as prescribed for severe pain unrelieved by ibuprofen and Dramamine  o Narcotics have significant side effects and only  cover-up  pain. They have no effect on the cause of pain.  o Common side effects  - Confusion, disorientation and sedation - DO NOT DRIVE OR OPERATE MACHINERY WITHIN 24 HOURS  - Nausea - take Dramamine or Zofran or Haldol to help control  - Constipation  - Sleep disturbances      Ondansetron (Zofran) Take as prescribed  o Reserve for severe nausea  o May cause constipation, start over the counter Miralax if needed      Second Line Anti-Nausea Medication: Adding a different anti-nausea medication maybe helpful for persistent nausea.  The combined effect of different types of anti-nausea medications maybe more  effective than either medication by itself, even in higher doses.  o Compazine: Take as prescribed      Information about kidney stones    Crystals can form if chemicals are too concentrated in your urine. If the crystal grows over time, a stone may form. A stone usually isn t painful while it is still in the kidney.    As the stone begins to leave the kidney, you may experience episodes of flank pain as the kidney stone approaches the entrance to the ureter. Some people feel a vague ache in the side.    Kidney stones may fall into the ureter. Some stones are tiny and pass through without causing symptoms. The ureter is a small tube (approximately 1/8 of an inch wide). A kidney stone can get stuck and block the ureter. If this happens, urine backs up and flows back to the kidney. Back pressure on the kidney can cause:  o Severe flank pain radiating to the groin.  o Severe nausea and vomiting.  o The pain can occur in the lower back, side, groin or all three.      When the stone reaches the lower ureter, this can irritate the bladder and sensations of feeling the urge to urinate frequently and urgently may occur.      Once the stone passes out of your ureter and into your bladder, the symptoms of urgency and frequency will often disappear. Sometimes pain will come back for a short period and will not be as severe as before. The passage of the stone from your bladder and out of your body is usually not a problem. The urethra is at least twice as wide as the normal ureter, so the stone doesn t usually block it.    Strain all urine  If you pass the stone, save it. Place it in the container we have provided and bring it to the [facility] within a week of passing it. Your stone will then be sent for analysis which takes about a month.     Signs and symptoms you might experience    Nausea    Decreased appetite    Urinary frequency    Bloody urine     Chills    Fatigue    When to call [facility] or  go to the Emergency Room    Fever with a temperature greater than 100.1    Severe pain    Persistent nausea/vomiting    If the pain worsens or nausea/vomiting is uncontrolled with medications, STOP eating & drinking. You need to have an empty stomach for 8 hours prior to surgery. Call the [facility] immediately at [phone].           Follow-up    Low dose CT scan with doctor visit 1-2 weeks after initial clinic visit per doctor s instructions    Please cancel the CT scan visit if you pass a stone. Reschedule for a one month follow-up with doctor to discuss stone composition and future prevention.    Preventing future stones    Approximately a month after your stone is sent out for analysis, a prevention visit will occur with your provider, to discuss an individualized plan for prevention of new stones and to discuss managing stones that you may still have. Along with the analysis of the kidney stone, blood and urine tests may be indicated to develop this plan. Knowing the type of kidney stones you make, and why, allows the providers at the [facility] to recommend specific ways to prevent them.    Follow-up visits are an important part of monitoring and preventing future re-occurrences.    The [facility] is available for questions or concerns 24 hours a day at [phone]

## 2023-01-31 NOTE — PROGRESS NOTES
Assessment/Plan:    Assessment & Plan   Stephanie was seen today for follow up.    Diagnoses and all orders for this visit:    Calculus of ureter  -     Patient Stated Goal: Pass my stone  -     CT Abdomen Pelvis w/o Contrast; Future        Stone Management Plan  Stone Management 1/17/2023 1/31/2023   Urinary Tract Infection Possible Infection No suspicion of infection   Renal Colic Well controlled symptoms Well controlled symptoms   Renal Failure No suspicion of renal failure No suspicion of renal failure   Current CT date 1/16/2023 1/30/2023   Right sided stones? Yes Yes   R Number of ureteral stones 1 1   R GSD of ureteral stones 6 6   R Location of ureteral stone Distal Distal   R Number of kidney stones  1 1   R GSD of kidney stones 2 - 4 2 - 4   R Hydronephrosis Moderate None   R Stone Event New event Established event   Diagnosis date 12/13/2022 -   Initial location of primary symptomatic stone Proximal -   Initial GSD of primary symptomatic stone 6 -   R MET status Initiation Progression   R Current Plan MET MET   MET 2 week F/U -   Left sided stones? No No   L Stone Event No current event No current event             PLAN    Video call duration: 7 minutes  Distant site (provider site): Remote; patient at home  12 minutes spent on the date of the encounter doing chart review, history and exam, documentation and further activities per the note    VICENTA CONN MD  [facility]    HPI  Ms. Stephanie De La Vega is a 32 year old  female who is being evaluated via a billable video visit by [facility] [place] for medical expulsive therapy follow up.     On last encounter, her 6 mm stone was in right mid ureter with Mild hydronephrosis. She has had no unanticipated post-operative events.    Symptoms have been minimal . Significant current symptoms include:  urinary frequency and dysuria. Pertinent negative current symptoms include:  fever, chills and  right flank pain.     New CT scan was personally reviewed and demonstrates progression of stone to right distal ureter with pelvic imagin.     She will continue to attempt to pass stone and will return in 1 month with further imaging if there is a question of stone persistence..    ROS   Review of systems is negative except for HPI.    Past Medical History:   Diagnosis Date     PCOS (polycystic ovarian syndrome)      Uncomplicated asthma        Past Surgical History:   Procedure Laterality Date     HC TOOTH EXTRACTION W/FORCEP  7/2009     HCL PAP SMEAR  4/2009       Current Outpatient Medications   Medication Sig Dispense Refill     albuterol (PROVENTIL HFA) 108 (90 Base) MCG/ACT inhaler Inhale 2 puffs into the lungs every 6 hours 8.5 g 0     albuterol (PROVENTIL HFA: VENTOLIN HFA) 108 (90 BASE) MCG/ACT inhaler Inhale 2 puffs into the lungs every 4 hours as needed for shortness of breath / dyspnea. (Patient not taking: Reported on 1/29/2022) 1 Inhaler 1     amphetamine-dextroamphetamine (ADDERALL) 20 MG tablet Take by mouth 2 times daily       atovaquone-proguanil (MALARONE) 250-100 MG per tablet Take 1 tablet by mouth daily. Begin taking daily 2 days before exposure and continuing until 7 days after exposure (Patient not taking: Reported on 1/29/2022) 30 tablet 0     dextroamphetamine (DEXEDRINE SPANSULE) 15 MG 24 hr capsule Take 30 mg by mouth daily       eszopiclone (LUNESTA) 2 MG tablet        FLUoxetine (PROZAC) 20 MG capsule Take 20 mg by mouth daily       NUVARING 0.12-0.015 MG/24HR VA RING  0 0     predniSONE (DELTASONE) 20 MG tablet Take 1 tablet (20 mg) by mouth 2 times daily 10 tablet 0       Allergies   Allergen Reactions     Adhesive Tape        Social History     Socioeconomic History     Marital status: Single     Spouse name: Not on file     Number of children: 0     Years of education: Not on file     Highest education level: Not on file   Occupational History     Employer: [employer]    Tobacco Use     Smoking status: Former     Smokeless tobacco: Never   Substance and Sexual Activity     Alcohol use: Yes     Alcohol/week: 1.7 standard drinks     Types: 2 Standard drinks or equivalent per week     Comment: once a weekend.      Drug use: Never     Sexual activity: Yes     Partners: Male     Birth control/protection: I.U.D.   Other Topics Concern      Service No     Blood Transfusions No     Caffeine Concern Not Asked     Occupational Exposure No     Hobby Hazards No     Sleep Concern No     Stress Concern No     Weight Concern Yes     Comment: 15 pounds weight loss     Special Diet Yes     Back Care Not Asked     Exercise Yes     Bike Helmet Not Asked     Seat Belt Yes     Self-Exams Not Asked     Parent/sibling w/ CABG, MI or angioplasty before 65F 55M? Not Asked   Social History Narrative     Not on file     Social Determinants of Health     Financial Resource Strain: Not on file   Food Insecurity: Not on file   Transportation Needs: Not on file   Physical Activity: Not on file   Stress: Not on file   Social Connections: Not on file   Intimate Partner Violence: Not on file   Housing Stability: Not on file       Family History   Problem Relation Age of Onset     Hypertension Mother      C.A.D. No family hx of      Diabetes No family hx of        Objective:     Appears AAO x 3  No vitals obtained due to virtual visit    Labs   Most Recent 3 CBC's:Recent Labs   Lab Test 07/20/19  2043   WBC 10.1   HGB 14.1   MCV 89        Most Recent 3 BMP's:Recent Labs   Lab Test 07/20/19  2043      POTASSIUM 3.6   CHLORIDE 111*   CO2 26   BUN 13   CR 0.66   ANIONGAP 4   CLEO 8.6   GLC 82     Most Recent Urinalysis:Recent Labs   Lab Test 01/16/23  1317   COLOR Yellow   APPEARANCE Clear   URINEGLC Negative   URINEBILI Negative   URINEKETONE Trace*   SG 1.015   UBLD Small*   URINEPH 5.0   PROTEIN Negative   UROBILINOGEN 0.2   NITRITE Negative   LEUKEST Trace*   RBCU 0-2   WBCU 5-10*     Acute  Labs   Urine Culture    Culture   Date Value Ref Range Status   01/16/2023 10,000-50,000 CFU/mL Mixture of urogenital marina  Final

## 2023-01-31 NOTE — PROGRESS NOTES
Patient is roomed via telephone for a virtual visit.  Patient confirmed she is in the [facility] [place] [facility] at the time of this appointment.  Patient understand that this visit is billable and agree to proceed with appointment.

## 2023-02-14 ENCOUNTER — TELEPHONE (OUTPATIENT)
Dept: UROLOGY | Facility: CLINIC | Age: 33
End: 2023-02-14
Payer: COMMERCIAL

## 2023-02-14 DIAGNOSIS — N20.1 CALCULUS OF URETER: Primary | ICD-10-CM

## 2023-02-14 NOTE — TELEPHONE ENCOUNTER
Patient having feeling of possible urinary tract infection.  She is also currently passing a ureteral stone.  No fever, but is having frequency and dysuria.  Patient will drop off urine specimen at [facility] lab in am, appointment made.  Joleen Silva RN

## 2023-02-15 ENCOUNTER — LAB (OUTPATIENT)
Dept: LAB | Facility: CLINIC | Age: 33
End: 2023-02-15
Payer: COMMERCIAL

## 2023-02-15 ENCOUNTER — TELEPHONE (OUTPATIENT)
Dept: UROLOGY | Facility: CLINIC | Age: 33
End: 2023-02-15

## 2023-02-15 DIAGNOSIS — N20.1 CALCULUS OF URETER: ICD-10-CM

## 2023-02-15 LAB
ALBUMIN UR-MCNC: 100 MG/DL
APPEARANCE UR: ABNORMAL
BACTERIA #/AREA URNS HPF: ABNORMAL /HPF
BILIRUB UR QL STRIP: NEGATIVE
COLOR UR AUTO: YELLOW
GLUCOSE UR STRIP-MCNC: NEGATIVE MG/DL
HGB UR QL STRIP: ABNORMAL
KETONES UR STRIP-MCNC: NEGATIVE MG/DL
LEUKOCYTE ESTERASE UR QL STRIP: ABNORMAL
NITRATE UR QL: NEGATIVE
PH UR STRIP: 8 [PH] (ref 5–7)
RBC #/AREA URNS AUTO: ABNORMAL /HPF
SP GR UR STRIP: 1.02 (ref 1–1.03)
SQUAMOUS #/AREA URNS AUTO: ABNORMAL /LPF
UROBILINOGEN UR STRIP-ACNC: 0.2 E.U./DL
WBC #/AREA URNS AUTO: ABNORMAL /HPF

## 2023-02-15 PROCEDURE — 87186 SC STD MICRODIL/AGAR DIL: CPT

## 2023-02-15 PROCEDURE — 81001 URINALYSIS AUTO W/SCOPE: CPT

## 2023-02-15 PROCEDURE — 87086 URINE CULTURE/COLONY COUNT: CPT

## 2023-02-15 NOTE — TELEPHONE ENCOUNTER
Spoke to patient regarding lab results.  Urine analysis does not indicate obvious infection going on.  Will wait for UC results. Will contact patient tomorrow with any updated results.

## 2023-02-15 NOTE — TELEPHONE ENCOUNTER
[facility]    Phone Message    May a detailed message be left on voicemail: yes     Reason for Call: Requesting Results   Name/type of test: Urine testing  Date of test: 2/14/23  Was test done at a location other than [facility] (Please fill in the location if not [facility])?: No    The patient called asking for urine results. She says she is experiencing more pain today, and general not feeling well. Please contact patient. Thank you.     Action Taken: Message routed to:  Other: [facility]    Travel Screening: Not Applicable

## 2023-02-16 DIAGNOSIS — N30.01 ACUTE CYSTITIS WITH HEMATURIA: Primary | ICD-10-CM

## 2023-02-16 RX ORDER — CIPROFLOXACIN 500 MG/1
500 TABLET, FILM COATED ORAL 2 TIMES DAILY
Qty: 14 TABLET | Refills: 0 | Status: SHIPPED | OUTPATIENT
Start: 2023-02-16 | End: 2023-02-23

## 2023-02-17 ENCOUNTER — TELEPHONE (OUTPATIENT)
Dept: UROLOGY | Facility: CLINIC | Age: 33
End: 2023-02-17
Payer: COMMERCIAL

## 2023-02-17 LAB — BACTERIA UR CULT: ABNORMAL

## 2023-02-17 NOTE — TELEPHONE ENCOUNTER
Patient was informed of her UC results.  Per provider to start Cipro.  Patient will pick prescription today.  Will anticipate follow appointment next week with new CT scan for ureteral stone status.

## 2023-02-20 ENCOUNTER — ANCILLARY PROCEDURE (OUTPATIENT)
Dept: CT IMAGING | Facility: CLINIC | Age: 33
End: 2023-02-20
Attending: UROLOGY
Payer: COMMERCIAL

## 2023-02-20 DIAGNOSIS — N20.1 CALCULUS OF URETER: ICD-10-CM

## 2023-02-20 PROCEDURE — 74176 CT ABD & PELVIS W/O CONTRAST: CPT | Mod: TC | Performed by: RADIOLOGY

## 2023-02-21 ENCOUNTER — VIRTUAL VISIT (OUTPATIENT)
Dept: UROLOGY | Facility: CLINIC | Age: 33
End: 2023-02-21
Payer: COMMERCIAL

## 2023-02-21 ENCOUNTER — TELEPHONE (OUTPATIENT)
Dept: UROLOGY | Facility: CLINIC | Age: 33
End: 2023-02-21

## 2023-02-21 DIAGNOSIS — N30.01 ACUTE CYSTITIS WITH HEMATURIA: ICD-10-CM

## 2023-02-21 DIAGNOSIS — N20.1 CALCULUS OF URETER: Primary | ICD-10-CM

## 2023-02-21 PROCEDURE — 99214 OFFICE O/P EST MOD 30 MIN: CPT | Mod: 95 | Performed by: UROLOGY

## 2023-02-21 RX ORDER — CIPROFLOXACIN 500 MG/1
500 TABLET, FILM COATED ORAL 2 TIMES DAILY
Qty: 14 TABLET | Refills: 0 | Status: SHIPPED | OUTPATIENT
Start: 2023-02-21 | End: 2023-02-28

## 2023-02-21 RX ORDER — ACETAMINOPHEN 500 MG
1000 TABLET ORAL
Status: CANCELLED | OUTPATIENT
Start: 2023-02-21

## 2023-02-21 RX ORDER — KETOROLAC TROMETHAMINE 30 MG/ML
15 INJECTION, SOLUTION INTRAMUSCULAR; INTRAVENOUS
Status: CANCELLED | OUTPATIENT
Start: 2023-02-21

## 2023-02-21 RX ORDER — GABAPENTIN 100 MG/1
300 CAPSULE ORAL
Status: CANCELLED | OUTPATIENT
Start: 2023-02-21
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
## 2023-02-21 NOTE — PATIENT INSTRUCTIONS
Patient Stated Goal: Know what to expect after surgery  Ureteroscopy    Ureteroscopy is a procedure which is done for clearance of stones from the ureter, kidney or both. There are no incisions involved. The procedure involves your surgeon placing a small scope into your urethra. This is the opening where urine leaves your body.  The surgeon watches as they carefully guide the scope to the stone(s).  Modern flexible ureteroscopes can be used to reach virtually any location within the urinary tract.     The size, shape and location of the stone determines how best to treat the stone(s).  Whenever possible, stones are removed in one piece.  Larger stones need to be broken using a laser before removing in smaller pieces.  The goal is to remove all stones and stone fragments from that side of the body in a single treatment.  Complete stone clearance is an important step to prevent future kidney stone episodes.    Surgery:    Same day outpatient procedure    30-60 minutes    Procedure done in hospital surgical suite    General anesthesia (you will be asleep during the procedure)     Antibiotic prior to surgery to prevent infection    Physician will visit with you and respond to any questions or concerns and consent will be signed prior to going to the operating room    Risks:    Infection - Preoperative antibiotics should prevent new infections but it is possible that unanticipated bacteria may be introduced at time of surgery or that the stones were actually chronically infected before surgery      Injury - The ureter may be injured during this procedure.  This is most likely to happen if the ureter was very inflamed before surgery or if a stone is very tightly impacted.  The surgeon will not aggressively treat a stone if this creates a risk of injury.        Inaccessible Stones -A single procedure is effective in 95% of cases, but if your ureter is very narrow or your kidney stone is very impacted, a stent will be  placed and the procedure stopped.  In 1-2 weeks after the ureter has relaxed, the patient will be brought back to surgery and the procedure can be safely performed.      Incomplete stone clearance -Occasionally stone or stone fragments may not be completely cleared.  These may pass on their own, which may cause discomfort.  Our goal is to remove all possible stones and fragments.    Stent:      An internal soft tube will be placed between the kidney and the bladder while in surgery (after the stone is cleared). The stent will keep the kidney draining.    What should I expect?     It is common for a stent to cause some irritation and discomfort.   You may have:      The need to urinate suddenly     The need to urinate often     Pain during urination     A dull backache, which may get worse during urination     Blood stained urine (like fruit punch) and occasional small clots    It s important to remember the stent is necessary and only temporary. To feel more comfortable:      Drink more than you normally would but you do not have to constantly  flush your kidneys     Limiting your activity may decrease irritation or bleeding    Ibuprofen - 2 tablets every 6-8 hours     Use pain medications as directed.    When is the stent removed?    Most stents are removed within 5 days to 2 weeks after a procedure.     How is the stent removed?     Your stent will be removed in the [facility] with a small telescope and a grasping tool.  It usually takes less than 1 minute to remove the stent.    What should I expect after the stent is removed?     You should feel normal by the next day    Some patients find:    An increase in back pain about an hour after the stent is removed as the kidney fills up with urine before it starts to empty.  It can be as uncomfortable as your initial stone episode.  Taking pain medications before stent removal may be helpful, but you would need someone else to drive you to and from your  appointment.    Bladder symptoms usually disappear by the next morning.    Small amounts of blood in the urine may be seen occasionally for up to a week.    Diet:      After surgery, there are no dietary restrictions - Drink to thirst, there is no need to increase intake of fluids, as this may increase nausea symptoms. Try to eat smaller, more frequent snacks, instead of large meals.    Activity:    Many people return to work within 1-2 days. Fatigue is normal for a couple of weeks following surgery. With increased activity you may experience more discomfort and you may notice more blood in your urine.      Post-Operative Symptom Control    While you recover from your procedure, you can take steps to ease your recovery.    Medications that prevent further episodes of severe pain and help stones pass: Take these as prescribed on a regular basis even if you are NOT in pain      Ibuprofen (Advil or Motrin) - Is available over the counter Take 2 (200mg) tablets every 6 hours until the stone passes.  o prevents spasm of the ureter.    o Decreases pain      Dramamine - (drowsy version, non-generic formulation) Is available over the counter and decreases spasm of the ureter.  Take 50mg at bedtime every night until the stone passes. In addition, take every 6 hours as needed.  Dramamine:  o Decreases nausea  o Decreases acute pain  o Decreases recurrence of pain for next 24 hours  o Will help you sleep        *This medication will cause increased drowsiness, do not drive or operate machinery for 6 hours      Flomax- Studies show that Flomax decreases irritation from stents.   o Take every day with food until stone passes even if you do not have pain  o Flomax does not relieve pain.        *This medication may cause nasal congestion or light-headedness      Detrol ( Tolterodine) - After surgery Detrol may decrease stent irritation and pelvic pain  o Take as prescribed     *This medication may cause dry mouth, constipation or  blurry vision. Stop medication if unable to urinate.    Medication that are taken as needed to manage break through symptoms: Take these ONLY as required and hopefully not at all      Narcotics (Percocet, Vicodin, Dilaudid)- take as prescribed for severe pain unrelieved by ibuprofen and dramamine  o Take as prescribed for severe pain  o Narcotics have significant side effects and only  cover-up  pain. They have no effect on cause of pain.  o Common side effects:  - Confusion, disorientation and sedation - DO NOT DRIVE OR OPERATE MACHINERY WITHIN 24 HOURS  - Nausea - take Dramamine or Zofran  or Haldol to help control  - Constipation  - Sleep disturbances      Ondansetron (Zofran)-  o Take as prescribed  o Reserve for severe nausea  o May cause constipation, start over the counter Miralax if needed to treat this    Haldol-  o Take as prescribed  o Reserve for severe nausea    Warning Signs/Symptoms - Please call the [facility] 24 hours a day at [phone] IMMEDIATELY if you experience any of these:    Fever greater than 100.1     Chills    Pain NOT CONTROLLED by pain medications    Heavy bleeding or large clots in urine (small clots can be normal)    Persistent nausea and/or vomiting    Post-Operative Follow up:    The stone(s) will be sent from surgery to a lab for composition analysis.  These results are usually available before a one month post-operative visit.  If you had laser treatment to break up your stone, you will usually be scheduled for a low dose CT scan prior to your one month appointment.  This scan allows your surgeon to confirm that all stone fragments were cleared at time of surgery and that there have been no complications.  These results along with possible labs and urine studies will help us develop an individualized plan to prevent new stones from forming and keep existing stones from enlarging.  This visit is usually scheduled about 1 month after your original surgery.    The  [facility] can respond to your questions or concerns 24 hours a day at [phone].

## 2023-02-21 NOTE — PROGRESS NOTES
Patient is roomed via telephone for a telehealth visit.  Patient confirmed she is in the [facility] at the time of this appointment.  Patient understand that this visit is billable and agree to proceed with appointment.

## 2023-02-21 NOTE — TELEPHONE ENCOUNTER
Spoke with: Patient      Date of surgery: Friday Feb 24 2023      Location: [facility]      Informed patient they will need a adult : YES      Pre op with provider: JAYDEN      H&P Scheduled in PAC- Dr Estrada will do DOS        Pre procedure covid : Patient knows to do a home covid test 1-2 days prior take photo of Neg test and bring in with her DOS       Additional imaging: NA        Surgery Packet : Patient given info over phone      Additional comments:

## 2023-02-21 NOTE — H&P (VIEW-ONLY)
Assessment/Plan:    Assessment & Plan   Stephanie was seen today for follow up.    Diagnoses and all orders for this visit:    Calculus of ureter  -     Patient Stated Goal: Know what to expect after surgery  -     Case Request: CYSTOURETEROSCOPY, WITH LASER LITHOTRIPSY, CALCULUS REMOVAL AND URETERAL STENT INSERTION; Standing  -     Case Request: CYSTOURETEROSCOPY, WITH LASER LITHOTRIPSY, CALCULUS REMOVAL AND URETERAL STENT INSERTION    Acute cystitis with hematuria  -     Patient Stated Goal: Know what to expect after surgery  -     ciprofloxacin (CIPRO) 500 MG tablet; Take 1 tablet (500 mg) by mouth 2 times daily for 7 days  -     Case Request: CYSTOURETEROSCOPY, WITH LASER LITHOTRIPSY, CALCULUS REMOVAL AND URETERAL STENT INSERTION; Standing  -     Case Request: CYSTOURETEROSCOPY, WITH LASER LITHOTRIPSY, CALCULUS REMOVAL AND URETERAL STENT INSERTION    Other orders  -     Forced Air Warming Device; Standing  -     Notify Provider - Anticoagulants and Antiplatelets; Standing  -     Glucose monitor nursing POCT; Standing  -     NPO per Anesthesia Guidelines for Procedure/Surgery Except for: Meds; Standing  -     Apply Pneumatic Compression Device (PCD); Standing  -     Pneumatic Compression Device (PCD) (Equipment); Standing  -     ceFAZolin (ANCEF) 2 g in sodium chloride 0.9 % 100 mL intermittent infusion  -     ceFAZolin (ANCEF) 2 g in sodium chloride 0.9 % 100 mL intermittent infusion  -     gabapentin (NEURONTIN) capsule 300 mg  -     ketorolac (TORADOL) injection 15 mg  -     acetaminophen (TYLENOL) tablet 1,000 mg  -     XR Surgery ASHKAN Fluoro Less Than 5 Min; Standing  -     XR Surgery ASHKAN Fluoro Less Than 5 Min        Stone Management Plan  Stone Management 1/17/2023 1/31/2023 2/21/2023   Urinary Tract Infection Possible Infection No suspicion of infection Suspected Infection   Renal Colic Well controlled symptoms Well controlled symptoms Well controlled symptoms   Renal Failure No suspicion of renal failure  No suspicion of renal failure No suspicion of renal failure   Current CT date 1/16/2023 1/30/2023 2/20/2023   Right sided stones? Yes Yes Yes   R Number of ureteral stones 1 1 1   R GSD of ureteral stones 6 6 6   R Location of ureteral stone Distal Distal Distal   R Number of kidney stones  1 1 No renal stones   R GSD of kidney stones 2 - 4 2 - 4 -   R Hydronephrosis Moderate None None   R Stone Event New event Established event Established event   Diagnosis date 12/13/2022 - -   Initial location of primary symptomatic stone Proximal - -   Initial GSD of primary symptomatic stone 6 - -   R MET status Initiation Progression Failure   Failure - - Infection   R Current Plan MET MET Clear   MET 2 week F/U - -   Clear rationale - - Associated treated infection   Left sided stones? No No No   L Stone Event No current event No current event No current event           PLAN      Phone call duration: 10 minutes  Patient location in [place]: Home  Distant site (provider site): Remote  15 minutes spent on the date of the encounter doing chart review, history and exam, documentation and further activities per the note    VICENTA CONN MD  [facility]    Subjective:     HPI  Ms. Stephanie De La Vega is a 32 year old  female who is being evaluated via a billable telephone visit by [facility] [place] for medical expulsive therapy follow up.     On last encounter, her 6 mm stone was in right distal ureter with Mild hydronephrosis. She has had outpatient management of urinary tract infection.    Symptoms have been well controlled with medication and she is able to carry on normal activities. Significant current symptoms include:  urinary frequency and dysuria. Pertinent negative current symptoms include:  fever, chills, right flank pain, nausea and vomiting.     New CT scan was personally reviewed and demonstrates no progression of stone with resolution of previous  hydronephrosis.     She has failed adequate duration of medical expulsive therapy and will proceed to the operating room for ureteroscopic stone clearance. Risks and benefits were detailed of ureteroscopic stone clearance including potential issues of urinary or systemic infection, ureteral injury, inaccessible stone, incomplete stone clearance, multiple surgeries, and stent related symptoms of urgency, frequency and hematuria.    She has cultured out for E coli and was started on ciprofloxacin. She is travelling to [place] for 2 days and hopefully will be able to navigate anticipated winter storm to return for surgery Friday.    Apparently, she just called the clinic to report she had a temp 99.5. She will cancel her travel and plan on attending OR Friday assuming she does not develop septic symptoms.         ROS   Review of systems is negative except for HPI.    Past Medical History:   Diagnosis Date     PCOS (polycystic ovarian syndrome)      Uncomplicated asthma        Past Surgical History:   Procedure Laterality Date     HC TOOTH EXTRACTION W/FORCEP  7/2009     HCL PAP SMEAR  4/2009       Current Outpatient Medications   Medication Sig Dispense Refill     ciprofloxacin (CIPRO) 500 MG tablet Take 1 tablet (500 mg) by mouth 2 times daily for 7 days 14 tablet 0     albuterol (PROVENTIL HFA) 108 (90 Base) MCG/ACT inhaler Inhale 2 puffs into the lungs every 6 hours 8.5 g 0     albuterol (PROVENTIL HFA: VENTOLIN HFA) 108 (90 BASE) MCG/ACT inhaler Inhale 2 puffs into the lungs every 4 hours as needed for shortness of breath / dyspnea. (Patient not taking: Reported on 1/29/2022) 1 Inhaler 1     amphetamine-dextroamphetamine (ADDERALL) 20 MG tablet Take by mouth 2 times daily       atovaquone-proguanil (MALARONE) 250-100 MG per tablet Take 1 tablet by mouth daily. Begin taking daily 2 days before exposure and continuing until 7 days after exposure (Patient not taking: Reported on 1/29/2022) 30 tablet 0      ciprofloxacin (CIPRO) 500 MG tablet Take 1 tablet (500 mg) by mouth 2 times daily for 7 days 14 tablet 0     dextroamphetamine (DEXEDRINE SPANSULE) 15 MG 24 hr capsule Take 30 mg by mouth daily       eszopiclone (LUNESTA) 2 MG tablet        FLUoxetine (PROZAC) 20 MG capsule Take 20 mg by mouth daily       NUVARING 0.12-0.015 MG/24HR VA RING  0 0     predniSONE (DELTASONE) 20 MG tablet Take 1 tablet (20 mg) by mouth 2 times daily 10 tablet 0       Allergies   Allergen Reactions     Adhesive Tape        Social History     Socioeconomic History     Marital status: Single     Spouse name: Not on file     Number of children: 0     Years of education: Not on file     Highest education level: Not on file   Occupational History     Employer: [employer]   Tobacco Use     Smoking status: Former     Smokeless tobacco: Never   Substance and Sexual Activity     Alcohol use: Yes     Alcohol/week: 1.7 standard drinks     Types: 2 Standard drinks or equivalent per week     Comment: once a weekend.      Drug use: Never     Sexual activity: Yes     Partners: Male     Birth control/protection: I.U.D.   Other Topics Concern      Service No     Blood Transfusions No     Caffeine Concern Not Asked     Occupational Exposure No     Hobby Hazards No     Sleep Concern No     Stress Concern No     Weight Concern Yes     Comment: 15 pounds weight loss     Special Diet Yes     Back Care Not Asked     Exercise Yes     Bike Helmet Not Asked     Seat Belt Yes     Self-Exams Not Asked     Parent/sibling w/ CABG, MI or angioplasty before 65F 55M? Not Asked   Social History Narrative     Not on file     Social Determinants of Health     Financial Resource Strain: Not on file   Food Insecurity: Not on file   Transportation Needs: Not on file   Physical Activity: Not on file   Stress: Not on file   Social Connections: Not on file   Intimate Partner Violence: Not on file   Housing Stability: Not on file       Family History   Problem  Relation Age of Onset     Hypertension Mother      C.A.D. No family hx of      Diabetes No family hx of        Objective:     No vitals or physical exam obtained due to virtual visit  Labs     Most Recent 3 CBC's:Recent Labs   Lab Test 07/20/19 2043   WBC 10.1   HGB 14.1   MCV 89        Most Recent 3 BMP's:Recent Labs   Lab Test 07/20/19 2043      POTASSIUM 3.6   CHLORIDE 111*   CO2 26   BUN 13   CR 0.66   ANIONGAP 4   CLEO 8.6   GLC 82     Most Recent Urinalysis:Recent Labs   Lab Test 02/15/23  0733   COLOR Yellow   APPEARANCE Slightly Cloudy*   URINEGLC Negative   URINEBILI Negative   URINEKETONE Negative   SG 1.020   UBLD Moderate*   URINEPH 8.0*   PROTEIN 100*   UROBILINOGEN 0.2   NITRITE Negative   LEUKEST Trace*   RBCU 25-50*   WBCU 5-10*     Acute Labs   Urine Culture    Culture   Date Value Ref Range Status   02/15/2023 >100,000 CFU/mL Escherichia coli (A)  Final   01/16/2023 10,000-50,000 CFU/mL Mixture of urogenital marina  Final

## 2023-02-23 ENCOUNTER — ANESTHESIA EVENT (OUTPATIENT)
Dept: SURGERY | Facility: AMBULATORY SURGERY CENTER | Age: 33
End: 2023-02-23
Payer: COMMERCIAL

## 2023-02-23 RX ORDER — ONDANSETRON 8 MG/1
TABLET, ORALLY DISINTEGRATING ORAL
COMMUNITY
Start: 2022-12-13 | End: 2023-02-23

## 2023-02-23 RX ORDER — DEXTROAMPHETAMINE SULFATE 5 MG/1
CAPSULE, EXTENDED RELEASE ORAL
COMMUNITY
Start: 2022-02-27 | End: 2023-02-23

## 2023-02-23 RX ORDER — LEVONORGESTREL AND ETHINYL ESTRADIOL 0.15-0.03
1 KIT ORAL DAILY
COMMUNITY
End: 2023-02-23

## 2023-02-23 RX ORDER — NITROFURANTOIN 25; 75 MG/1; MG/1
100 CAPSULE ORAL 2 TIMES DAILY
COMMUNITY
Start: 2023-01-10 | End: 2023-02-23

## 2023-02-23 RX ORDER — DEXTROAMPHETAMINE SULFATE 10 MG/1
20 CAPSULE, EXTENDED RELEASE ORAL DAILY
COMMUNITY
Start: 2022-06-24 | End: 2023-02-23

## 2023-02-23 RX ORDER — HYDROCODONE BITARTRATE AND ACETAMINOPHEN 5; 325 MG/1; MG/1
1 TABLET ORAL EVERY 4 HOURS PRN
COMMUNITY
Start: 2022-12-13 | End: 2023-02-23

## 2023-02-23 RX ORDER — PHENAZOPYRIDINE HYDROCHLORIDE 200 MG/1
1 TABLET, FILM COATED ORAL
COMMUNITY
Start: 2023-01-10 | End: 2023-02-23

## 2023-02-23 RX ORDER — ESZOPICLONE 1 MG/1
1 TABLET, FILM COATED ORAL
COMMUNITY
Start: 2023-02-15

## 2023-02-23 RX ORDER — TAMSULOSIN HYDROCHLORIDE 0.4 MG/1
0.4 CAPSULE ORAL AT BEDTIME
COMMUNITY
Start: 2022-12-13 | End: 2023-02-23

## 2023-02-24 ENCOUNTER — ANESTHESIA (OUTPATIENT)
Dept: SURGERY | Facility: AMBULATORY SURGERY CENTER | Age: 33
End: 2023-02-24
Payer: COMMERCIAL

## 2023-02-24 ENCOUNTER — HOSPITAL ENCOUNTER (OUTPATIENT)
Facility: AMBULATORY SURGERY CENTER | Age: 33
Discharge: HOME | End: 2023-02-24
Attending: UROLOGY
Payer: COMMERCIAL

## 2023-02-24 VITALS
SYSTOLIC BLOOD PRESSURE: 121 MMHG | HEART RATE: 73 BPM | RESPIRATION RATE: 16 BRPM | BODY MASS INDEX: 40.18 KG/M2 | OXYGEN SATURATION: 100 % | HEIGHT: 66 IN | DIASTOLIC BLOOD PRESSURE: 57 MMHG | WEIGHT: 250 LBS | TEMPERATURE: 97.1 F

## 2023-02-24 DIAGNOSIS — N30.01 ACUTE CYSTITIS WITH HEMATURIA: ICD-10-CM

## 2023-02-24 DIAGNOSIS — N20.1 CALCULUS OF URETER: ICD-10-CM

## 2023-02-24 DIAGNOSIS — N20.1 CALCULUS OF URETER: Primary | ICD-10-CM

## 2023-02-24 PROCEDURE — 52332 CYSTOSCOPY AND TREATMENT: CPT | Mod: RT | Performed by: UROLOGY

## 2023-02-24 PROCEDURE — 82365 CALCULUS SPECTROSCOPY: CPT | Mod: 90 | Performed by: UROLOGY

## 2023-02-24 PROCEDURE — 52352 CYSTOURETERO W/STONE REMOVE: CPT | Mod: RT | Performed by: UROLOGY

## 2023-02-24 PROCEDURE — 99000 SPECIMEN HANDLING OFFICE-LAB: CPT | Performed by: UROLOGY

## 2023-02-24 DEVICE — URETERAL STENT
Type: IMPLANTABLE DEVICE | Site: URETER | Status: FUNCTIONAL
Brand: PERCUFLEX™ PLUS

## 2023-02-24 RX ORDER — CEFAZOLIN SODIUM 2 G/100ML
2 INJECTION, SOLUTION INTRAVENOUS SEE ADMIN INSTRUCTIONS
Status: DISCONTINUED | OUTPATIENT
Start: 2023-02-24 | End: 2023-02-25 | Stop reason: HOSPADM

## 2023-02-24 RX ORDER — ONDANSETRON 2 MG/ML
INJECTION INTRAMUSCULAR; INTRAVENOUS PRN
Status: DISCONTINUED | OUTPATIENT
Start: 2023-02-24 | End: 2023-02-24

## 2023-02-24 RX ORDER — SODIUM CHLORIDE, SODIUM LACTATE, POTASSIUM CHLORIDE, CALCIUM CHLORIDE 600; 310; 30; 20 MG/100ML; MG/100ML; MG/100ML; MG/100ML
INJECTION, SOLUTION INTRAVENOUS CONTINUOUS
Status: DISCONTINUED | OUTPATIENT
Start: 2023-02-24 | End: 2023-02-25 | Stop reason: HOSPADM

## 2023-02-24 RX ORDER — TAMSULOSIN HYDROCHLORIDE 0.4 MG/1
0.4 CAPSULE ORAL DAILY
Qty: 20 CAPSULE | Refills: 0 | Status: SHIPPED | OUTPATIENT
Start: 2023-02-24 | End: 2023-03-16

## 2023-02-24 RX ORDER — ONDANSETRON 2 MG/ML
4 INJECTION INTRAMUSCULAR; INTRAVENOUS EVERY 30 MIN PRN
Status: DISCONTINUED | OUTPATIENT
Start: 2023-02-24 | End: 2023-02-25 | Stop reason: HOSPADM

## 2023-02-24 RX ORDER — GABAPENTIN 300 MG/1
300 CAPSULE ORAL
Status: COMPLETED | OUTPATIENT
Start: 2023-02-24 | End: 2023-02-24

## 2023-02-24 RX ORDER — LIDOCAINE 40 MG/G
CREAM TOPICAL
Status: DISCONTINUED | OUTPATIENT
Start: 2023-02-24 | End: 2023-02-25 | Stop reason: HOSPADM

## 2023-02-24 RX ORDER — ACETAMINOPHEN 325 MG/1
975 TABLET ORAL ONCE
Status: DISCONTINUED | OUTPATIENT
Start: 2023-02-24 | End: 2023-02-25 | Stop reason: HOSPADM

## 2023-02-24 RX ORDER — OXYCODONE HYDROCHLORIDE 5 MG/1
5 TABLET ORAL
Status: COMPLETED | OUTPATIENT
Start: 2023-02-24 | End: 2023-02-24

## 2023-02-24 RX ORDER — FENTANYL CITRATE 0.05 MG/ML
25 INJECTION, SOLUTION INTRAMUSCULAR; INTRAVENOUS
Status: DISCONTINUED | OUTPATIENT
Start: 2023-02-24 | End: 2023-02-25 | Stop reason: HOSPADM

## 2023-02-24 RX ORDER — FENTANYL CITRATE 50 UG/ML
INJECTION, SOLUTION INTRAMUSCULAR; INTRAVENOUS PRN
Status: DISCONTINUED | OUTPATIENT
Start: 2023-02-24 | End: 2023-02-24

## 2023-02-24 RX ORDER — HYDROMORPHONE HCL IN WATER/PF 6 MG/30 ML
0.4 PATIENT CONTROLLED ANALGESIA SYRINGE INTRAVENOUS EVERY 5 MIN PRN
Status: DISCONTINUED | OUTPATIENT
Start: 2023-02-24 | End: 2023-02-25 | Stop reason: HOSPADM

## 2023-02-24 RX ORDER — FENTANYL CITRATE 0.05 MG/ML
50 INJECTION, SOLUTION INTRAMUSCULAR; INTRAVENOUS EVERY 5 MIN PRN
Status: DISCONTINUED | OUTPATIENT
Start: 2023-02-24 | End: 2023-02-25 | Stop reason: HOSPADM

## 2023-02-24 RX ORDER — PROPOFOL 10 MG/ML
INJECTION, EMULSION INTRAVENOUS CONTINUOUS PRN
Status: DISCONTINUED | OUTPATIENT
Start: 2023-02-24 | End: 2023-02-24

## 2023-02-24 RX ORDER — KETOROLAC TROMETHAMINE 15 MG/ML
15 INJECTION, SOLUTION INTRAMUSCULAR; INTRAVENOUS
Status: DISCONTINUED | OUTPATIENT
Start: 2023-02-24 | End: 2023-02-25 | Stop reason: HOSPADM

## 2023-02-24 RX ORDER — CEFAZOLIN SODIUM 2 G/100ML
2 INJECTION, SOLUTION INTRAVENOUS
Status: COMPLETED | OUTPATIENT
Start: 2023-02-24 | End: 2023-02-24

## 2023-02-24 RX ORDER — OXYCODONE HYDROCHLORIDE 10 MG/1
10 TABLET ORAL
Status: DISCONTINUED | OUTPATIENT
Start: 2023-02-24 | End: 2023-02-25 | Stop reason: HOSPADM

## 2023-02-24 RX ORDER — OXYCODONE HYDROCHLORIDE 5 MG/1
5-10 TABLET ORAL EVERY 6 HOURS PRN
Qty: 20 TABLET | Refills: 0 | Status: SHIPPED | OUTPATIENT
Start: 2023-02-24 | End: 2023-02-27

## 2023-02-24 RX ORDER — HYDROMORPHONE HCL IN WATER/PF 6 MG/30 ML
0.2 PATIENT CONTROLLED ANALGESIA SYRINGE INTRAVENOUS EVERY 5 MIN PRN
Status: DISCONTINUED | OUTPATIENT
Start: 2023-02-24 | End: 2023-02-25 | Stop reason: HOSPADM

## 2023-02-24 RX ORDER — PROPOFOL 10 MG/ML
INJECTION, EMULSION INTRAVENOUS PRN
Status: DISCONTINUED | OUTPATIENT
Start: 2023-02-24 | End: 2023-02-24

## 2023-02-24 RX ORDER — LIDOCAINE HYDROCHLORIDE 10 MG/ML
INJECTION, SOLUTION INFILTRATION; PERINEURAL PRN
Status: DISCONTINUED | OUTPATIENT
Start: 2023-02-24 | End: 2023-02-24

## 2023-02-24 RX ORDER — ACETAMINOPHEN 500 MG
1000 TABLET ORAL
Status: COMPLETED | OUTPATIENT
Start: 2023-02-24 | End: 2023-02-24

## 2023-02-24 RX ORDER — FENTANYL CITRATE 0.05 MG/ML
25 INJECTION, SOLUTION INTRAMUSCULAR; INTRAVENOUS EVERY 5 MIN PRN
Status: DISCONTINUED | OUTPATIENT
Start: 2023-02-24 | End: 2023-02-25 | Stop reason: HOSPADM

## 2023-02-24 RX ORDER — ONDANSETRON 4 MG/1
4 TABLET, ORALLY DISINTEGRATING ORAL EVERY 30 MIN PRN
Status: DISCONTINUED | OUTPATIENT
Start: 2023-02-24 | End: 2023-02-25 | Stop reason: HOSPADM

## 2023-02-24 RX ORDER — DEXAMETHASONE SODIUM PHOSPHATE 4 MG/ML
INJECTION, SOLUTION INTRA-ARTICULAR; INTRALESIONAL; INTRAMUSCULAR; INTRAVENOUS; SOFT TISSUE PRN
Status: DISCONTINUED | OUTPATIENT
Start: 2023-02-24 | End: 2023-02-24

## 2023-02-24 RX ORDER — GLYCOPYRROLATE 0.2 MG/ML
INJECTION, SOLUTION INTRAMUSCULAR; INTRAVENOUS PRN
Status: DISCONTINUED | OUTPATIENT
Start: 2023-02-24 | End: 2023-02-24

## 2023-02-24 RX ADMIN — GLYCOPYRROLATE 0.2 MG: 0.2 INJECTION, SOLUTION INTRAMUSCULAR; INTRAVENOUS at 09:00

## 2023-02-24 RX ADMIN — PROPOFOL 200 MCG/KG/MIN: 10 INJECTION, EMULSION INTRAVENOUS at 09:00

## 2023-02-24 RX ADMIN — FENTANYL CITRATE 100 MCG: 50 INJECTION, SOLUTION INTRAMUSCULAR; INTRAVENOUS at 09:00

## 2023-02-24 RX ADMIN — DEXAMETHASONE SODIUM PHOSPHATE 10 MG: 4 INJECTION, SOLUTION INTRA-ARTICULAR; INTRALESIONAL; INTRAMUSCULAR; INTRAVENOUS; SOFT TISSUE at 09:03

## 2023-02-24 RX ADMIN — GABAPENTIN 300 MG: 300 CAPSULE ORAL at 07:43

## 2023-02-24 RX ADMIN — CEFAZOLIN SODIUM 2 G: 2 INJECTION, SOLUTION INTRAVENOUS at 08:54

## 2023-02-24 RX ADMIN — PROPOFOL 200 MG: 10 INJECTION, EMULSION INTRAVENOUS at 09:00

## 2023-02-24 RX ADMIN — Medication 1000 MG: at 07:43

## 2023-02-24 RX ADMIN — ONDANSETRON 4 MG: 2 INJECTION INTRAMUSCULAR; INTRAVENOUS at 09:15

## 2023-02-24 RX ADMIN — SODIUM CHLORIDE, SODIUM LACTATE, POTASSIUM CHLORIDE, CALCIUM CHLORIDE: 600; 310; 30; 20 INJECTION, SOLUTION INTRAVENOUS at 07:43

## 2023-02-24 RX ADMIN — KETOROLAC TROMETHAMINE 15 MG: 15 INJECTION, SOLUTION INTRAMUSCULAR; INTRAVENOUS at 07:43

## 2023-02-24 RX ADMIN — LIDOCAINE HYDROCHLORIDE 3 ML: 10 INJECTION, SOLUTION INFILTRATION; PERINEURAL at 09:00

## 2023-02-24 RX ADMIN — OXYCODONE HYDROCHLORIDE 5 MG: 5 TABLET ORAL at 10:15

## 2023-02-24 NOTE — OP NOTE
[facility]  [facility] Operative Note    Stephanie De La Vega   February 24, 2023 2/24/2023   Physicians, Jen Ceja. Family     Procedure Performed  Procedure(s):  CYSTOURETEROSCOPY, RETROGRADE PYELOGRAM, CALCULUS REMOVAL AND URETERAL STENT INSERTION  1. Cystoscopy  2. Retrograde Pyelography - Right  3. Ureteroscopic Stone Extraction - Right Ureter Distal  4. Ureteral Stent Insertion - Right      Pre-operative Diagnosis  Calculus of ureter [N20.1]  Acute cystitis with hematuria [N30.01]    Post-operative Diagnosis  1. Stone Right Ureter Distal      Surgeon(s) and Role:     * Brayan Estrada MD - Primary    Anesthesia Type  Not documented     Procedural Summary    Estimation of stone clearance: <2 mm residual  Subjective stone composition: calcium  Renal papillae assessment: not observed  Unanticipated event/findings: none  Post-operative plan: remove own stent in 6 days with extraction string return to clinic in 1 month with urine culture and without  CT scan    Narrative    Successful clearance of two adjacent right distal stones.    Procedural Details    Patient is brought to the surgical suite where anesthesia is induced. she is prepped and draped in standard fashion in lithotomy position.    Cystoscopy: Rigid cystoscopy is performed. Introitus is normal. Bladder has edema at right ureteric orifice.     Retrograde Pyelography:  imaging demonstrates radio-opaque distal ureteric stone consistent with pre-operative imaging. Right retrograde pyelography demonstrates radio-opaque distal ureteric stone with obstruction.     Ureteral Access: Right ureteral access is initiated with Sensor wire. Ureteral stone is moderately impacted.  Guide wire access to the kidney is assured. 8F dilator is inserted with minimal resistance. 10F dilator is inserted with minimal resistance.      Rigid Ureteroscopy: Rigid ureteroscope is inserted in right ureter. Two distal stones consistent with most recent CT are  identified and extracted intact.      Ureteral Stent Insertion: Right 7F 26 cm stent is inserted with good coil in kidney and bladder under fluoroscopic guidance. Stent extraction string left dangling from urethra.      The patient was then taken to the recovery room in good condition.     Past Medical History:   Diagnosis Date     Gastroesophageal reflux disease      PCOS (polycystic ovarian syndrome)      Renal disease      Uncomplicated asthma         Patient Active Problem List   Diagnosis     Seasonal allergies     CARDIOVASCULAR SCREENING; LDL GOAL LESS THAN 160     Calculus of ureter     Acute cystitis with hematuria        Estimated Blood Loss  .* No values recorded between 2/24/2023  9:05 AM and 2/24/2023  9:19 AM *     ID Type Source Tests Collected by Time Destination   A :  Calculus/Stone Ureter, Right STONE ANALYSIS Brayan Estrada MD 2/24/2023  9:11 AM

## 2023-02-24 NOTE — ANESTHESIA POSTPROCEDURE EVALUATION
Patient: Stephanie De La Vega    Procedure: Procedure(s):  CYSTOURETEROSCOPY, RETROGRADE PYELOGRAM, CALCULUS REMOVAL AND URETERAL STENT INSERTION       Anesthesia Type:  General    Note:  Disposition: Outpatient   Postop Pain Control: Uneventful            Sign Out: Well controlled pain   PONV: No   Neuro/Psych: Uneventful            Sign Out: Acceptable/Baseline neuro status   Airway/Respiratory: Uneventful            Sign Out: Acceptable/Baseline resp. status   CV/Hemodynamics: Uneventful            Sign Out: Acceptable CV status; No obvious hypovolemia; No obvious fluid overload   Other NRE: NONE   DID A NON-ROUTINE EVENT OCCUR? No           Last vitals:  Vitals Value Taken Time   /66 02/24/23 0927   Temp 97.1  F (36.2  C) 02/24/23 0921   Pulse 86 02/24/23 0927   Resp 16 02/24/23 0927   SpO2 96 % 02/24/23 0927       Electronically Signed By: Tim Mays MD  February 24, 2023  10:06 AM

## 2023-02-24 NOTE — DISCHARGE INSTRUCTIONS
Take tylenol and ibuprofen every 6 hours as your main form of pain control.     Acetaminophen (Tylenol): Next Dose: 1:45 PM. Take 650-1000 mg every 6 hours for mild to moderate pain.    Ibuprofen (Motrin, Advil): Next Dose: 1:45 PM. Take 600 mg every 6 hours for mild to moderate pain.    Do not exceed 4,000 mg of acetaminophen during a 24 hour period  or 1000 mg per dose. Keep in mind that acetaminophen can also be found in many over-the-counter cold medications as well as narcotics that may be given for pain.    Going Home With a Ureteral Stent    What is it?  A stent is a soft, plastic tube that helps urine (pee) drain into the bladder.  During the surgery, it is placed in the ureter the tube that connects the kidney to the bladder.  A thin curl at each end of the stent keeps one end in your kidney and the other in your bladder.  The stent can not be seen from outside of the body.    Why Do I Have It?  Some sort of blockage is not letting pee drain into your bladder.  This could be from a stone, certain surgeries or kidney infection.    What Should I Expect?   Stents often cause some discomfort.  You may have:    The need to pee suddenly    Pain when you pee    A dull backache, which may get worse when you pee  Blood in your pee (color of fruit punch) and some clots, which may increase with physical activity.     What Can I Do To Feel Better?    Drink a little more fluids than usual.  You can eat your normal diet.    Enjoy a warm bath.  Decrease your activity.  Some people find bending or twisting movement cause discomfort or increased blood in the urine.  Even so, you will not harm yourself.                                                        [facility]                                                        Stent Removal With String    -Take Tylenol or Ibuprofen and drink fluids 1 hour prior to removing your stent at home.    -Remove the stent in the morning on the specific day as directed by  "your doctor.  Gently pull on the string in the shower while urinating until the stent is completely removed.    -Call [facility] if you have questions or concerns [phone]    -What To Expect After Your Stent Is Removed    -After stent removal, urine may be bloody and possibly have some bloody clots.    -You may experience an \"achy\" pain due to urethral spasms.  This generally only lasts a few hours, but should resolve over the next 2-3 days      "

## 2023-02-24 NOTE — ANESTHESIA PREPROCEDURE EVALUATION
Anesthesia Pre-Procedure Evaluation    Patient: Stephanie De La Vega   MRN: [id number] : 1990        Procedure : Procedure(s):  CYSTOURETEROSCOPY, WITH LASER LITHOTRIPSY, CALCULUS REMOVAL AND URETERAL STENT INSERTION          Past Medical History:   Diagnosis Date     Gastroesophageal reflux disease      PCOS (polycystic ovarian syndrome)      Renal disease      Uncomplicated asthma       Past Surgical History:   Procedure Laterality Date     HC TOOTH EXTRACTION W/FORCEP  2009     HCL PAP SMEAR  2009      Allergies   Allergen Reactions     Adhesive Tape       Social History     Tobacco Use     Smoking status: Never     Smokeless tobacco: Never   Substance Use Topics     Alcohol use: Yes     Alcohol/week: 1.7 standard drinks     Types: 2 Standard drinks or equivalent per week     Comment: once a weekend.       Wt Readings from Last 1 Encounters:   23 113.4 kg (250 lb)        Anesthesia Evaluation   Pt has had prior anesthetic.     No history of anesthetic complications       ROS/MED HX  ENT/Pulmonary:  - neg pulmonary ROS   (+) asthma (exercise induced, last needed inhlaer 1 year ago)     Neurologic:  - neg neurologic ROS     Cardiovascular:  - neg cardiovascular ROS     METS/Exercise Tolerance: >4 METS    Hematologic:  - neg hematologic  ROS     Musculoskeletal:  - neg musculoskeletal ROS     GI/Hepatic:  - neg GI/hepatic ROS   (+) GERD (rare),     Renal/Genitourinary:  - neg Renal ROS   (+) Nephrolithiasis ,     Endo:  - neg endo ROS   (+) Obesity (bmi 40),     Psychiatric/Substance Use:  - neg psychiatric ROS     Infectious Disease:  - neg infectious disease ROS     Malignancy:  - neg malignancy ROS     Other:  - neg other ROS          Physical Exam    Airway  airway exam normal      Mallampati: II   TM distance: > 3 FB   Neck ROM: full   Mouth opening: > 3 cm    Respiratory Devices and Support         Dental       (+) Minor Abnormalities - some fillings, tiny chips      Cardiovascular    cardiovascular exam normal          Pulmonary   pulmonary exam normal                OUTSIDE LABS:  CBC:   Lab Results   Component Value Date    WBC 10.1 07/20/2019    HGB 14.1 07/20/2019    HCT 41.4 07/20/2019     07/20/2019     BMP:   Lab Results   Component Value Date     07/20/2019    POTASSIUM 3.6 07/20/2019    CHLORIDE 111 (H) 07/20/2019    CO2 26 07/20/2019    BUN 13 07/20/2019    CR 0.66 07/20/2019    GLC 82 07/20/2019     COAGS: No results found for: PTT, INR, FIBR  POC:   Lab Results   Component Value Date    HCG Negative 04/25/2005    HCGS Negative 07/20/2019     HEPATIC:   Lab Results   Component Value Date    ALBUMIN 3.4 07/20/2019    PROTTOTAL 7.1 07/20/2019    ALT 23 07/20/2019    AST 19 07/20/2019    ALKPHOS 62 07/20/2019    BILITOTAL 0.4 07/20/2019     OTHER:   Lab Results   Component Value Date    CLEO 8.6 07/20/2019    SED 7 06/26/2009       Anesthesia Plan    ASA Status:  3   NPO Status:  NPO Appropriate    Anesthesia Type: General.     - Airway: LMA   Induction: Propofol.   Maintenance: TIVA.        Consents    Anesthesia Plan(s) and associated risks, benefits, and realistic alternatives discussed. Questions answered and patient/representative(s) expressed understanding.    - Discussed:     - Discussed with:  Patient         Postoperative Care    Pain management: Multi-modal analgesia.   PONV prophylaxis: Ondansetron (or other 5HT-3), Dexamethasone or Solumedrol     Comments:    Other Comments: Reviewed anesthetic options and risks, including risk of dental trauma. Patient agrees to proceed.             Tim Mays MD

## 2023-02-24 NOTE — ANESTHESIA CARE TRANSFER NOTE
Patient: Stephanie De La Vega    Procedure: Procedure(s):  CYSTOURETEROSCOPY, RETROGRADE PYELOGRAM, CALCULUS REMOVAL AND URETERAL STENT INSERTION       Diagnosis: Calculus of ureter [N20.1]  Acute cystitis with hematuria [N30.01]  Diagnosis Additional Information: No value filed.    Anesthesia Type:   General     Note:    Oropharynx: oropharynx clear of all foreign objects and spontaneously breathing  Level of Consciousness: drowsy  Oxygen Supplementation: face mask  Level of Supplemental Oxygen (L/min / FiO2): 6  Independent Airway: airway patency satisfactory and stable  Dentition: dentition unchanged  Vital Signs Stable: post-procedure vital signs reviewed and stable  Report to RN Given: handoff report given  Patient transferred to: PACU    Handoff Report: Identifed the Patient, Identified the Reponsible Provider, Reviewed the pertinent medical history, Discussed the surgical course, Reviewed Intra-OP anesthesia mangement and issues during anesthesia, Set expectations for post-procedure period and Allowed opportunity for questions and acknowledgement of understanding      Vitals:  Vitals Value Taken Time   /70 02/24/23 0921   Temp 97.1  F (36.2  C) 02/24/23 0921   Pulse 83 02/24/23 0921   Resp 14    SpO2 97 % 02/24/23 0921       Electronically Signed By: CALLUM Hollins CRNA  February 24, 2023  9:23 AM

## 2023-02-25 ENCOUNTER — NURSE TRIAGE (OUTPATIENT)
Dept: NURSING | Facility: CLINIC | Age: 33
End: 2023-02-25
Payer: COMMERCIAL

## 2023-02-26 LAB
APPEARANCE STONE: NORMAL
COMPN STONE: NORMAL
SPECIMEN WT: 15 MG

## 2023-02-26 NOTE — TELEPHONE ENCOUNTER
Nurse Triage SBAR    Is this a 2nd Level Triage? YES, LICENSED PRACTITIONER REVIEW IS REQUIRED    Situation: Patient calling to report severe pain in urethra with urinating and activity.    Background: :Patient is POD 1 calculus removal and right ureteral stent insertion.      Assessment: Patient reports she took Lunesta and is inquiring if able to take prn oxycodone. She reports she has been taking acetaminophen and ibuprofen without improvement of pain.  She reports some improvement of pain earlier when she had take Oxycodone.  She reports severe burning at location of stent.  She reports watermelon colored urine, denies blood clots.  Patient denies fever, inability to urinate, or weakness.    Protocol Recommended Disposition:   According to the protocol, patient should Go to ED now (or PCP triage).      Paged to provider, Dr Estrada at [phone]  Unable to reach urologist on-call.  Patient unable to connect with 24 hour pharmacist.    Recommended that patient go to ED per protocol, patient refuses as she is sleepy after taking Lunesta.  She plans to call back in the morning to have urologist paged.    Rosio Jean Baptiste RN  02/25/23 11:15 PM  [facility] Nurse Advisor        Reason for Disposition    [1] SEVERE pain (e.g., excruciating, scale 8-10) AND [2] not improved after pain medicine    Additional Information    Negative: Shock suspected (e.g., cold/pale/clammy skin, too weak to stand, low BP, rapid pulse)    Negative: Sounds like a life-threatening emergency to the triager    Negative: [1] Back pain AND [2] NOT recently diagnosed with a kidney stone    Negative: [1] Flank pain (i.e., pain in the side, over the lower ribs or just below the ribs) AND [2] NOT recently diagnosed with a kidney stone    Negative: [1] Unable to urinate (or only a few drops) > 4 hours AND [2] bladder feels very full (e.g., palpable bladder or strong urge to urinate)    Protocols used: KIDNEY STONE FOLLOW-UP CALL-A-

## 2023-03-02 ENCOUNTER — TELEPHONE (OUTPATIENT)
Dept: UROLOGY | Facility: CLINIC | Age: 33
End: 2023-03-02
Payer: COMMERCIAL

## 2023-03-02 DIAGNOSIS — N20.1 CALCULUS OF URETER: Primary | ICD-10-CM

## 2023-03-02 NOTE — TELEPHONE ENCOUNTER
Spoke with patient who will remove her stent today, questions answered.  She is set up for uc and vv w/o imaging.  Joleen Silva RN

## 2023-04-03 ENCOUNTER — LAB (OUTPATIENT)
Dept: LAB | Facility: CLINIC | Age: 33
End: 2023-04-03
Payer: COMMERCIAL

## 2023-04-03 DIAGNOSIS — N20.1 CALCULUS OF URETER: ICD-10-CM

## 2023-04-03 PROCEDURE — 87086 URINE CULTURE/COLONY COUNT: CPT

## 2023-04-05 LAB — BACTERIA UR CULT: NO GROWTH

## 2023-04-18 ENCOUNTER — VIRTUAL VISIT (OUTPATIENT)
Dept: UROLOGY | Facility: CLINIC | Age: 33
End: 2023-04-18
Payer: COMMERCIAL

## 2023-04-18 DIAGNOSIS — N20.1 CALCULUS OF URETER: Primary | ICD-10-CM

## 2023-04-18 PROCEDURE — 99213 OFFICE O/P EST LOW 20 MIN: CPT | Mod: VID | Performed by: UROLOGY

## 2023-04-18 NOTE — PROGRESS NOTES
Patient is roomed via telephone for a telehealth visit.  Patient confirmed she is in the [facility] at the time of this appointment.  Patient understand that this visit is billable and agree to proceed with appointment.

## 2023-04-18 NOTE — PATIENT INSTRUCTIONS
Patient Stated Goal: Prevent further stones  Calcium Oxalate Stone Prevention Self Management    Drink more fluids:    Drinking more liquids is the best way you can help prevent future stones. Stones can form when substances in the urine are too concentrated. The more you drink, the more urine you will make. This means all substances in the urine will be less concentrated.    How much urine should I be producing?    The usual recommended daily urine production is about 2 to 3 quarts (0189-8065 ml). If you are producing more than 3 quarts of urine on a regular basis, it is possible to deplete important minerals stored in the body.    To measure the amount of urine you produce in a day, you can either:    Collect all urine in a container and measure at the end of the day     Use a measuring cup each time you urinate and add up the amounts at the end of the day     Observe    Color - Dark carol urine is concentrated. Light straw color or lighter is dilute and desirable     Odor - Concentrated urine tends to smell stronger. Dilute urine is nearly odorless    Ways to increase your fluid intake    Increasing the amount of fluid you drink is effective for all types of kidney stones. While water is commonly recommended, all fluids are effective for increasing the amount of urine your body produces.    Focus on starting a lifelong habit, rather than a short-term solution.     Keep liquids on hand that you like. Crystal Light is a low calorie appropriate choice.    Drink out of larger glasses. You'll tend to drink more with each serving.     Have an additional glass of fluid with each meal.     Keep a water or drink bottle at work and fill it regularly.     *If you are prone to fluid retention, consult your doctor before making changes to your fluid habits.    Low Oxalate Diet:    Avoid excess amounts or daily consumption of these foods:    All nuts and nut products including peanuts, almonds, pecans, peanut butter, almond  milk    Rhubarb    Chocolate    Soybeans and soy products     Spinach    Wheat Germ    Beets    Maintain a normal calcium diet:    Researches have found that people with low calcium intakes tend to have more stones. Foods with high calcium content are acceptable and include:    Dairy products (including milk, cheese and yogurt)    Meat and fish    Enriched cereals    Dark green vegetables    What about calcium supplements?     Many people take calcium supplements, either on their own or as prescribed by a doctor. Research has indicated that calcium supplements do not usually pose a risk for stone formation.  Calcium citrate is a better choice for a supplement.    Avoid excess salt:    Salt (sodium chloride) is found in abundance in many foods. High sodium levels in the urine can interfere with the kidney's handling of calcium.     Tips for reducing the salt in your diet:    Don't use salt at the table    Reduce the salt used in food preparation. Try 1/2 teaspoon when recipes call for 1 teaspoon.    Use herbs and spices for flavoring instead of salt.    Avoid salty foods.    Check the label before you buy or use a product. Note sodium and portion size information.    Try to consume less than 2,000 mg/day. (1 teaspoon = 2,000 mg)    Foods with high sodium content include:    Processed meat (including luncheon meats, sausage)     Crackers     Instant cereal     Processed cheese     Canned soups     Chips and snack foods     Soy sauce    [facility] [place] can respond to your questions or concerns 24 hours a day at [phone].

## 2023-04-18 NOTE — PROGRESS NOTES
Assessment/Plan:    Assessment & Plan   Stephanie was seen today for post op.    Diagnoses and all orders for this visit:    Calculus of ureter  -     Patient Stated Goal: Prevent further stones        Stone Management Plan      1/31/2023     1:00 PM 2/21/2023     1:00 PM 4/18/2023     4:00 PM   Stone Management   Urinary Tract Infection No suspicion of infection Suspected Infection No suspicion of infection   Renal Colic Well controlled symptoms Well controlled symptoms Asymptomatic at this time   Renal Failure No suspicion of renal failure No suspicion of renal failure No suspicion of renal failure   Current CT date 1/30/2023 2/20/2023    Right sided stones? Yes Yes    R Number of ureteral stones 1 1    R GSD of ureteral stones 6 6    R Location of ureteral stone Distal Distal    R Number of kidney stones  1 No renal stones    R GSD of kidney stones 2 - 4     R Hydronephrosis None None    R Stone Event Established event Established event Resolved event   Resolved date   4/18/2023   R Post-op status   No imaging   R MET status Progression Failure    Failure  Infection    R Current Plan MET Clear    Clear rationale  Associated treated infection    Left sided stones? No No    L Stone Event No current event No current event No current event           PLAN      Phone call duration: 8 minutes  Patient location in [place]: Home  Distant site (provider site): Remote  13 minutes spent by me on the date of the encounter doing chart review, history and exam, documentation and further activities per the note    VICENTA CONN MD  [facility]    Subjective:     HPI  Ms. Stephanie De La Vega is a 32 year old  female who is being evaluated via a billable telephone visit by [facility] for late postoperative follow-up.     She returns status post Right ureteroscopic extraction for distal ureteral stone. She has had no unanticipated post-operative events.      She is asymptomatic at present. She denies symptoms of fever, chills, flank pain, nausea, vomiting, urinary frequency and dysuria. Occasional right sided awareness.    Imaging was not performed today because stone was extracted intact and patient is asymptomatic.     Stone composition was 100% calcium oxalate.     Follow up urine culture was no growth.    She is a low risk first time stone former and was educated on conservative strategies for risk reduction.          ROS   Review of systems is negative except for HPI.    Past Medical History:   Diagnosis Date     Gastroesophageal reflux disease      PCOS (polycystic ovarian syndrome)      Renal disease      Uncomplicated asthma        Past Surgical History:   Procedure Laterality Date     COMBINED CYSTOSCOPY, INSERT STENT URETER(S) Right 2/24/2023    Procedure: CYSTOURETEROSCOPY, RETROGRADE PYELOGRAM, CALCULUS REMOVAL AND URETERAL STENT INSERTION;  Surgeon: Brayan Estrada MD;  Location: [facility] TOOTH EXTRACTION W/FORCEP  7/2009     HCL PAP SMEAR  4/2009       Current Outpatient Medications   Medication Sig Dispense Refill     eszopiclone (LUNESTA) 1 MG tablet Take 1 mg by mouth       FLUoxetine (PROZAC) 20 MG capsule Take 20 mg by mouth daily         Allergies   Allergen Reactions     Adhesive Tape        Social History     Socioeconomic History     Marital status: Single     Spouse name: Not on file     Number of children: 0     Years of education: Not on file     Highest education level: Not on file   Occupational History     Employer: [employer]   Tobacco Use     Smoking status: Never     Smokeless tobacco: Never   Vaping Use     Vaping status: Not on file   Substance and Sexual Activity     Alcohol use: Yes     Alcohol/week: 1.7 standard drinks of alcohol     Types: 2 Standard drinks or equivalent per week     Comment: once a weekend.      Drug use: Never     Sexual activity: Yes     Partners: Male     Birth control/protection:  I.U.D.   Other Topics Concern      Service No     Blood Transfusions No     Caffeine Concern Not Asked     Occupational Exposure No     Hobby Hazards No     Sleep Concern No     Stress Concern No     Weight Concern Yes     Comment: 15 pounds weight loss     Special Diet Yes     Back Care Not Asked     Exercise Yes     Bike Helmet Not Asked     Seat Belt Yes     Self-Exams Not Asked     Parent/sibling w/ CABG, MI or angioplasty before 65F 55M? Not Asked   Social History Narrative     Not on file     Social Determinants of Health     Financial Resource Strain: Not on file   Food Insecurity: Not on file   Transportation Needs: Not on file   Physical Activity: Not on file   Stress: Not on file   Social Connections: Not on file   Intimate Partner Violence: Not on file   Housing Stability: Not on file       Family History   Problem Relation Age of Onset     Hypertension Mother      C.A.D. No family hx of      Diabetes No family hx of        Objective:     No vitals or physical exam obtained due to virtual visit  Labs     Most Recent 3 CBC's:Recent Labs   Lab Test 07/20/19 2043   WBC 10.1   HGB 14.1   MCV 89        Most Recent 3 BMP's:Recent Labs   Lab Test 07/20/19 2043      POTASSIUM 3.6   CHLORIDE 111*   CO2 26   BUN 13   CR 0.66   ANIONGAP 4   CLEO 8.6   GLC 82     7-Day Micro Results     No results found for the last 168 hours.        Most Recent Urinalysis:Recent Labs   Lab Test 02/15/23  0733   COLOR Yellow   APPEARANCE Slightly Cloudy*   URINEGLC Negative   URINEBILI Negative   URINEKETONE Negative   SG 1.020   UBLD Moderate*   URINEPH 8.0*   PROTEIN 100*   UROBILINOGEN 0.2   NITRITE Negative   LEUKEST Trace*   RBCU 25-50*   WBCU 5-10*     Acute Labs   Urine Culture    Culture   Date Value Ref Range Status   04/03/2023 No Growth  Final   02/15/2023 >100,000 CFU/mL Escherichia coli (A)  Final   01/16/2023 10,000-50,000 CFU/mL Mixture of urogenital marina  Final

## 2023-05-10 ENCOUNTER — LAB REQUISITION (OUTPATIENT)
Dept: LAB | Facility: CLINIC | Age: 33
End: 2023-05-10
Payer: COMMERCIAL

## 2023-05-10 DIAGNOSIS — L30.8 OTHER SPECIFIED DERMATITIS: ICD-10-CM

## 2023-05-10 PROCEDURE — 87070 CULTURE OTHR SPECIMN AEROBIC: CPT | Mod: ORL | Performed by: DERMATOLOGY

## 2023-05-12 LAB — BACTERIA SPEC CULT: NORMAL

## 2023-05-22 ENCOUNTER — TELEPHONE (OUTPATIENT)
Dept: UROLOGY | Facility: CLINIC | Age: 33
End: 2023-05-22
Payer: COMMERCIAL

## 2023-05-22 DIAGNOSIS — N20.1 CALCULUS OF URETER: Primary | ICD-10-CM

## 2023-05-22 NOTE — TELEPHONE ENCOUNTER
[facility] Call Center    Phone Message    May a detailed message be left on voicemail: yes     Reason for Call: Symptoms or Concerns     If patient has red-flag symptoms, warm transfer to triage line    Current symptom or concern: Patient had surgery on February 2023 with a post-op follow up on 4/18/23.  Per patient, pain on kidney side. Pain level at 7. Aching pain and feels bruised.    Symptoms have been present for:  1 month(s)    Has patient previously been seen for this? No    Are there any new or worsening symptoms? Yes: Aching pain      Action Taken: Message routed to:  Other: [facility]    Travel Screening: Not Applicable

## 2023-05-23 ENCOUNTER — ANCILLARY PROCEDURE (OUTPATIENT)
Dept: CT IMAGING | Facility: CLINIC | Age: 33
End: 2023-05-23
Attending: UROLOGY
Payer: COMMERCIAL

## 2023-05-23 DIAGNOSIS — N20.1 CALCULUS OF URETER: ICD-10-CM

## 2023-05-23 PROCEDURE — 74176 CT ABD & PELVIS W/O CONTRAST: CPT | Mod: TC | Performed by: RADIOLOGY

## 2023-05-23 NOTE — TELEPHONE ENCOUNTER
Radiology report does not show any stones or hydro.  Patient advised and will contact her pcp.  Joleen Silva RN

## 2023-05-23 NOTE — TELEPHONE ENCOUNTER
Pt called and VERY frustrated that she has kidney damage due to this kidney pain she has been having. Also very upset that the contrast was not done w/ contrast to make sure there was no damage to her kidneys, please call pt back to further discuss, thanks!

## 2023-05-23 NOTE — TELEPHONE ENCOUNTER
Spoke with patient who is concerned with her kidney pain.  Results of ct are still pending, will contact patient when report is viewable to evaluate any stone involvement.  Joleen Silva RN

## 2023-08-27 ENCOUNTER — HEALTH MAINTENANCE LETTER (OUTPATIENT)
Age: 33
End: 2023-08-27

## 2023-09-08 DIAGNOSIS — R19.7 DIARRHEA: Primary | ICD-10-CM

## 2023-10-24 DIAGNOSIS — Z00.00 ROUTINE GENERAL MEDICAL EXAMINATION AT A HEALTH CARE FACILITY: Primary | ICD-10-CM

## 2023-10-24 DIAGNOSIS — E88.810 METABOLIC SYNDROME: ICD-10-CM

## 2023-12-04 ENCOUNTER — ANCILLARY PROCEDURE (OUTPATIENT)
Dept: MRI IMAGING | Facility: CLINIC | Age: 33
End: 2023-12-04
Attending: FAMILY MEDICINE
Payer: COMMERCIAL

## 2023-12-04 DIAGNOSIS — Z80.3 FAMILY HISTORY OF BREAST CANCER: ICD-10-CM

## 2023-12-04 DIAGNOSIS — Z91.89 AT HIGH RISK FOR BREAST CANCER: ICD-10-CM

## 2023-12-04 PROCEDURE — 255N000002 HC RX 255 OP 636: Mod: JZ | Performed by: FAMILY MEDICINE

## 2023-12-04 PROCEDURE — A9585 GADOBUTROL INJECTION: HCPCS | Mod: JZ | Performed by: FAMILY MEDICINE

## 2023-12-04 PROCEDURE — 77049 MRI BREAST C-+ W/CAD BI: CPT

## 2023-12-04 RX ORDER — GADOBUTROL 604.72 MG/ML
10 INJECTION INTRAVENOUS ONCE
Status: COMPLETED | OUTPATIENT
Start: 2023-12-04 | End: 2023-12-04

## 2023-12-04 RX ADMIN — GADOBUTROL 10 ML: 604.72 INJECTION INTRAVENOUS at 09:19

## 2024-10-20 ENCOUNTER — HEALTH MAINTENANCE LETTER (OUTPATIENT)
Age: 34
End: 2024-10-20

## 2024-11-19 NOTE — ED AVS SNAPSHOT
Emergency Department  [address] [place] [id number]  Phone:  [phone]  Fax:  [phone]                                    Stephanie De La Vega   MRN: [id number]    Department:   Emergency Department   Date of Visit:  7/20/2019           After Visit Summary Signature Page    I have received my discharge instructions, and my questions have been answered. I have discussed any challenges I see with this plan with the nurse or doctor.    ..........................................................................................................................................  Patient/Patient Representative Signature      ..........................................................................................................................................  Patient Representative Print Name and Relationship to Patient    ..................................................               ................................................  Date                                   Time    ..........................................................................................................................................  Reviewed by Signature/Title    ...................................................              ..............................................  Date                                               Time          22EPIC Rev 08/18       
Detail Level: Detailed
Quality 226: Preventive Care And Screening: Tobacco Use: Screening And Cessation Intervention: Patient screened for tobacco use and is an ex/non-smoker